# Patient Record
Sex: MALE | Race: WHITE | Employment: OTHER | URBAN - METROPOLITAN AREA
[De-identification: names, ages, dates, MRNs, and addresses within clinical notes are randomized per-mention and may not be internally consistent; named-entity substitution may affect disease eponyms.]

---

## 2021-07-21 ENCOUNTER — HOSPITAL ENCOUNTER (INPATIENT)
Age: 86
LOS: 5 days | Discharge: HOME | DRG: 415 | End: 2021-07-26
Attending: INTERNAL MEDICINE | Admitting: INTERNAL MEDICINE
Payer: MEDICARE

## 2021-07-21 DIAGNOSIS — K80.50 CHOLEDOCHOLITHIASIS: Primary | ICD-10-CM

## 2021-07-21 LAB
ALBUMIN SERPL-MCNC: 4.3 GM/DL (ref 3.4–5)
ALP BLD-CCNC: 280 IU/L (ref 40–129)
ALT SERPL-CCNC: 676 U/L (ref 10–40)
AMYLASE: 46 U/L (ref 25–115)
ANION GAP SERPL CALCULATED.3IONS-SCNC: 11 MMOL/L (ref 4–16)
APTT: 27 SECONDS (ref 25.1–37.1)
AST SERPL-CCNC: 443 IU/L (ref 15–37)
BASOPHILS ABSOLUTE: 0 K/CU MM
BASOPHILS RELATIVE PERCENT: 0.7 % (ref 0–1)
BILIRUB SERPL-MCNC: 2.8 MG/DL (ref 0–1)
BUN BLDV-MCNC: 15 MG/DL (ref 6–23)
CALCIUM SERPL-MCNC: 9 MG/DL (ref 8.3–10.6)
CHLORIDE BLD-SCNC: 98 MMOL/L (ref 99–110)
CO2: 30 MMOL/L (ref 21–32)
CREAT SERPL-MCNC: 1.2 MG/DL (ref 0.9–1.3)
DIFFERENTIAL TYPE: ABNORMAL
EOSINOPHILS ABSOLUTE: 0.1 K/CU MM
EOSINOPHILS RELATIVE PERCENT: 1.3 % (ref 0–3)
GFR AFRICAN AMERICAN: >60 ML/MIN/1.73M2
GFR NON-AFRICAN AMERICAN: 58 ML/MIN/1.73M2
GLUCOSE BLD-MCNC: 124 MG/DL (ref 70–99)
HCT VFR BLD CALC: 48 % (ref 42–52)
HEMOGLOBIN: 16.1 GM/DL (ref 13.5–18)
IMMATURE NEUTROPHIL %: 0.9 % (ref 0–0.43)
INR BLD: 0.94 INDEX
LIPASE: 35 IU/L (ref 13–60)
LYMPHOCYTES ABSOLUTE: 0.5 K/CU MM
LYMPHOCYTES RELATIVE PERCENT: 8.9 % (ref 24–44)
MCH RBC QN AUTO: 26.9 PG (ref 27–31)
MCHC RBC AUTO-ENTMCNC: 33.5 % (ref 32–36)
MCV RBC AUTO: 80.1 FL (ref 78–100)
MONOCYTES ABSOLUTE: 0.6 K/CU MM
MONOCYTES RELATIVE PERCENT: 10.4 % (ref 0–4)
NUCLEATED RBC %: 0 %
PDW BLD-RTO: 17.1 % (ref 11.7–14.9)
PLATELET # BLD: 206 K/CU MM (ref 140–440)
PMV BLD AUTO: 10.1 FL (ref 7.5–11.1)
POTASSIUM SERPL-SCNC: 4.1 MMOL/L (ref 3.5–5.1)
PROTHROMBIN TIME: 12.1 SECONDS (ref 11.7–14.5)
RBC # BLD: 5.99 M/CU MM (ref 4.6–6.2)
SEGMENTED NEUTROPHILS ABSOLUTE COUNT: 4.2 K/CU MM
SEGMENTED NEUTROPHILS RELATIVE PERCENT: 77.8 % (ref 36–66)
SODIUM BLD-SCNC: 139 MMOL/L (ref 135–145)
TOTAL IMMATURE NEUTOROPHIL: 0.05 K/CU MM
TOTAL NUCLEATED RBC: 0 K/CU MM
TOTAL PROTEIN: 5.9 GM/DL (ref 6.4–8.2)
WBC # BLD: 5.4 K/CU MM (ref 4–10.5)

## 2021-07-21 PROCEDURE — 82150 ASSAY OF AMYLASE: CPT

## 2021-07-21 PROCEDURE — 83690 ASSAY OF LIPASE: CPT

## 2021-07-21 PROCEDURE — 36415 COLL VENOUS BLD VENIPUNCTURE: CPT

## 2021-07-21 PROCEDURE — 6370000000 HC RX 637 (ALT 250 FOR IP): Performed by: INTERNAL MEDICINE

## 2021-07-21 PROCEDURE — 1200000000 HC SEMI PRIVATE

## 2021-07-21 PROCEDURE — 85730 THROMBOPLASTIN TIME PARTIAL: CPT

## 2021-07-21 PROCEDURE — 2580000003 HC RX 258: Performed by: INTERNAL MEDICINE

## 2021-07-21 PROCEDURE — 85025 COMPLETE CBC W/AUTO DIFF WBC: CPT

## 2021-07-21 PROCEDURE — 94761 N-INVAS EAR/PLS OXIMETRY MLT: CPT

## 2021-07-21 PROCEDURE — 80053 COMPREHEN METABOLIC PANEL: CPT

## 2021-07-21 PROCEDURE — 85610 PROTHROMBIN TIME: CPT

## 2021-07-21 RX ORDER — SODIUM CHLORIDE 9 MG/ML
INJECTION, SOLUTION INTRAVENOUS CONTINUOUS
Status: DISCONTINUED | OUTPATIENT
Start: 2021-07-21 | End: 2021-07-22

## 2021-07-21 RX ORDER — SODIUM CHLORIDE 0.9 % (FLUSH) 0.9 %
5-40 SYRINGE (ML) INJECTION EVERY 12 HOURS SCHEDULED
Status: DISCONTINUED | OUTPATIENT
Start: 2021-07-21 | End: 2021-07-26 | Stop reason: HOSPADM

## 2021-07-21 RX ORDER — TAMSULOSIN HYDROCHLORIDE 0.4 MG/1
0.4 CAPSULE ORAL DAILY
Status: DISCONTINUED | OUTPATIENT
Start: 2021-07-21 | End: 2021-07-26 | Stop reason: HOSPADM

## 2021-07-21 RX ORDER — ACETAMINOPHEN 650 MG/1
650 SUPPOSITORY RECTAL EVERY 6 HOURS PRN
Status: DISCONTINUED | OUTPATIENT
Start: 2021-07-21 | End: 2021-07-26 | Stop reason: HOSPADM

## 2021-07-21 RX ORDER — SODIUM CHLORIDE 9 MG/ML
25 INJECTION, SOLUTION INTRAVENOUS PRN
Status: DISCONTINUED | OUTPATIENT
Start: 2021-07-21 | End: 2021-07-26 | Stop reason: HOSPADM

## 2021-07-21 RX ORDER — AMLODIPINE BESYLATE 10 MG/1
10 TABLET ORAL DAILY
Status: DISCONTINUED | OUTPATIENT
Start: 2021-07-21 | End: 2021-07-26 | Stop reason: HOSPADM

## 2021-07-21 RX ORDER — POLYETHYLENE GLYCOL 3350 17 G/17G
17 POWDER, FOR SOLUTION ORAL DAILY PRN
Status: DISCONTINUED | OUTPATIENT
Start: 2021-07-21 | End: 2021-07-26 | Stop reason: HOSPADM

## 2021-07-21 RX ORDER — MORPHINE SULFATE 2 MG/ML
2 INJECTION, SOLUTION INTRAMUSCULAR; INTRAVENOUS EVERY 6 HOURS PRN
Status: DISCONTINUED | OUTPATIENT
Start: 2021-07-21 | End: 2021-07-23

## 2021-07-21 RX ORDER — ACETAMINOPHEN 325 MG/1
650 TABLET ORAL EVERY 6 HOURS PRN
Status: DISCONTINUED | OUTPATIENT
Start: 2021-07-21 | End: 2021-07-26 | Stop reason: HOSPADM

## 2021-07-21 RX ORDER — SODIUM CHLORIDE 0.9 % (FLUSH) 0.9 %
5-40 SYRINGE (ML) INJECTION PRN
Status: DISCONTINUED | OUTPATIENT
Start: 2021-07-21 | End: 2021-07-26 | Stop reason: HOSPADM

## 2021-07-21 RX ORDER — ONDANSETRON 4 MG/1
4 TABLET, ORALLY DISINTEGRATING ORAL EVERY 8 HOURS PRN
Status: DISCONTINUED | OUTPATIENT
Start: 2021-07-21 | End: 2021-07-26 | Stop reason: HOSPADM

## 2021-07-21 RX ORDER — ONDANSETRON 2 MG/ML
4 INJECTION INTRAMUSCULAR; INTRAVENOUS EVERY 6 HOURS PRN
Status: DISCONTINUED | OUTPATIENT
Start: 2021-07-21 | End: 2021-07-26 | Stop reason: HOSPADM

## 2021-07-21 RX ADMIN — AMLODIPINE BESYLATE 10 MG: 10 TABLET ORAL at 20:03

## 2021-07-21 RX ADMIN — SODIUM CHLORIDE, PRESERVATIVE FREE 10 ML: 5 INJECTION INTRAVENOUS at 20:03

## 2021-07-21 RX ADMIN — METOPROLOL TARTRATE 25 MG: 25 TABLET, FILM COATED ORAL at 21:25

## 2021-07-21 RX ADMIN — TAMSULOSIN HYDROCHLORIDE 0.4 MG: 0.4 CAPSULE ORAL at 20:01

## 2021-07-21 RX ADMIN — SODIUM CHLORIDE, PRESERVATIVE FREE 10 ML: 5 INJECTION INTRAVENOUS at 20:01

## 2021-07-21 RX ADMIN — SODIUM CHLORIDE: 9 INJECTION, SOLUTION INTRAVENOUS at 21:25

## 2021-07-21 ASSESSMENT — PAIN SCALES - GENERAL: PAINLEVEL_OUTOF10: 0

## 2021-07-21 NOTE — H&P
History and Physical      Name:  Najma Jimenez /Age/Sex: 1935  (82 y.o. male)   MRN & CSN:  [id number] & [id number] Admission Date/Time: 2021  5:22 PM   Location:  [facility] PCP: No primary care provider on file. Hospital Day: 1    Assessment and Plan:   Najma Jimenez is a 82 y.o.  male  who presents with abdominal pain    1) Acute Symptomatic Cholelithiasis and Choledocholithiasis   -Ultrasound negative for cholecystitis but showed cholelithiasis  -MRCP done at the referring hospital:mild intrahepatic biliary dilatation of the left lobe and multiple filling defects are seen filling the distal 2.5 cm of CBD  -AST/ALT/ALP elevated  -GI on board for ERCP  -Will need general surgery evaluation for cholecystectomy      2) Essential hypertension  -Uncontrolled  -Resume meds    Other chronic medical conditions, medication resumed unless contraindicated    BPH  GERD  Hyperlipidemia      Diet Diet NPO   DVT Prophylaxis [] Lovenox, []  Heparin, [] SCDs, [] Ambulation   GI Prophylaxis [] PPI,  [] H2 Blocker,  [] Carafate,  [] Diet/Tube Feeds   Code Status Full Code   Disposition Patient requires continued admission due to abdominal stone   MDM [] Low, [x] Moderate,[]  High  Patient's risk as above due to choledocholithiasis     History of Present Illness:     Chief Complaint: <principal problem not specified>  Najma Jimenez is a 82 y.o.  male  who presents with abdominal pain. Patient reported pain started about couple of days ago, described as sharp across the abdomen with intensity of 10 out of 10 and no apparent radiation to any other part of the body. Reported associated nausea and vomiting. Denied any fever, chills. No diarrhea, reports constipation. As a result of this, he was evaluated at [facility]. Blood work suggestive of elevated liver enzymes, abdominal ultrasound reviewed cholelithiasis with no evidence of cholecystitis.  MRCP was done which showed mild intrahepatic biliary dilatation of the left lobe and multiple filling defects are seen filling the distal 2.5 cm of CBD. Patient was transferred to our center for possible ERCP       Ten point ROS reviewed negative, unless as noted above    Objective:   No intake or output data in the 24 hours ending 07/21/21 1753   Vitals: There were no vitals filed for this visit. Physical Exam:   GEN Awake male, sitting upright in bed in no apparent distress. Appears given age. EYES Pupils are equally round. No scleral erythema, discharge, or conjunctivitis. HENT Mucous membranes are moist. Oral pharynx without exudates, no evidence of thrush. NECK Supple, no apparent thyromegaly or masses. RESP Clear to auscultation, no wheezes, rales or rhonchi. Symmetric chest movement while on room air. CARDIO/VASC S1/S2 auscultated. Regular rate without appreciable murmurs, rubs, or gallops. No JVD or carotid bruits. Peripheral pulses equal bilaterally and palpable. No peripheral edema. GI Abdomen is soft without significant tenderness, masses, or guarding. Bowel sounds are normoactive. Rectal exam deferred.  No costovertebral angle tenderness. Normal appearing external genitalia. Alberts catheter is not present. HEME/LYMPH No palpable cervical lymphadenopathy and no hepatosplenomegaly. No petechiae or ecchymoses. MSK No gross joint deformities. SKIN Normal coloration, warm, dry. NEURO Cranial nerves appear grossly intact, normal speech, no lateralizing weakness. PSYCH Awake, alert, oriented x 4. Affect appropriate. Past Medical History:    No past medical history on file. PSHX:  has no past surgical history on file.     Allergies: Not on File    FAM HX: Reviewed but noncontributory   Soc HX:   Social History     Socioeconomic History    Marital status: Not on file     Spouse name: Not on file    Number of children: Not on file    Years of education: Not on file    Highest education level: Not on file   Occupational History    Not on file   Tobacco Use    Smoking status: Not on file   Substance and Sexual Activity    Alcohol use: Not on file    Drug use: Not on file    Sexual activity: Not on file   Other Topics Concern    Not on file   Social History Narrative    Not on file     Social Determinants of Health     Financial Resource Strain:     Difficulty of Paying Living Expenses:    Food Insecurity:     Worried About Running Out of Food in the Last Year:     [address] in the Last Year:    Transportation Needs:     Lack of Transportation (Medical):      Lack of Transportation (Non-Medical):    Physical Activity:     Days of Exercise per Week:     Minutes of Exercise per Session:    Stress:     Feeling of Stress :    Social Connections:     Frequency of Communication with Friends and Family:     Frequency of Social Gatherings with Friends and Family:     Attends Alev[employer] Services:     Active Member of Clubs or Organizations:     Attends Club or Organization Meetings:     Marital Status:    Intimate Partner Violence:     Fear of Current or Ex-Partner:     Emotionally Abused:     Physically Abused:     Sexually Abused:        Medications:   Medications:    sodium chloride flush  5-40 mL Intravenous 2 times per day    [START ON 7/22/2021] enoxaparin  40 mg Subcutaneous Daily      Infusions:    sodium chloride      sodium chloride       PRN Meds: sodium chloride flush, 5-40 mL, PRN  sodium chloride, 25 mL, PRN  ondansetron, 4 mg, Q8H PRN   Or  ondansetron, 4 mg, Q6H PRN  polyethylene glycol, 17 g, Daily PRN  acetaminophen, 650 mg, Q6H PRN   Or  acetaminophen, 650 mg, Q6H PRN  morphine, 2 mg, Q6H PRN      Prior to Admission medications    Not on File         Electronically signed by Taylor Paula MD on 7/21/2021 at 5:53 PM

## 2021-07-21 NOTE — PROGRESS NOTES
Patient arrived to room 4128. No skin issues noted. Registration called, working on getting patient admitted into [facility]. Will notify hospitalist once patient is in [facility] [address] system. Patient denies any needs at this time.

## 2021-07-22 ENCOUNTER — ANESTHESIA (OUTPATIENT)
Dept: ENDOSCOPY | Age: 86
DRG: 415 | End: 2021-07-22
Payer: MEDICARE

## 2021-07-22 ENCOUNTER — ANESTHESIA EVENT (OUTPATIENT)
Dept: OPERATING ROOM | Age: 86
DRG: 415 | End: 2021-07-22
Payer: MEDICARE

## 2021-07-22 ENCOUNTER — ANESTHESIA EVENT (OUTPATIENT)
Dept: ENDOSCOPY | Age: 86
DRG: 415 | End: 2021-07-22
Payer: MEDICARE

## 2021-07-22 ENCOUNTER — APPOINTMENT (OUTPATIENT)
Dept: GENERAL RADIOLOGY | Age: 86
DRG: 415 | End: 2021-07-22
Attending: INTERNAL MEDICINE
Payer: MEDICARE

## 2021-07-22 VITALS
SYSTOLIC BLOOD PRESSURE: 134 MMHG | DIASTOLIC BLOOD PRESSURE: 88 MMHG | TEMPERATURE: 97.1 F | RESPIRATION RATE: 25 BRPM | OXYGEN SATURATION: 100 %

## 2021-07-22 LAB
ALBUMIN SERPL-MCNC: 3.6 GM/DL (ref 3.4–5)
ALP BLD-CCNC: 230 IU/L (ref 40–128)
ALT SERPL-CCNC: 635 U/L (ref 10–40)
AMYLASE: 38 U/L (ref 25–115)
ANION GAP SERPL CALCULATED.3IONS-SCNC: 11 MMOL/L (ref 4–16)
AST SERPL-CCNC: 392 IU/L (ref 15–37)
BASOPHILS ABSOLUTE: 0 K/CU MM
BASOPHILS RELATIVE PERCENT: 0.8 % (ref 0–1)
BILIRUB SERPL-MCNC: 2.4 MG/DL (ref 0–1)
BUN BLDV-MCNC: 14 MG/DL (ref 6–23)
CALCIUM SERPL-MCNC: 8.9 MG/DL (ref 8.3–10.6)
CHLORIDE BLD-SCNC: 101 MMOL/L (ref 99–110)
CO2: 30 MMOL/L (ref 21–32)
CREAT SERPL-MCNC: 1.2 MG/DL (ref 0.9–1.3)
DIFFERENTIAL TYPE: ABNORMAL
EOSINOPHILS ABSOLUTE: 0.1 K/CU MM
EOSINOPHILS RELATIVE PERCENT: 1.6 % (ref 0–3)
GFR AFRICAN AMERICAN: >60 ML/MIN/1.73M2
GFR NON-AFRICAN AMERICAN: 58 ML/MIN/1.73M2
GLUCOSE BLD-MCNC: 106 MG/DL (ref 70–99)
HCT VFR BLD CALC: 44.8 % (ref 42–52)
HEMOGLOBIN: 14.6 GM/DL (ref 13.5–18)
IMMATURE NEUTROPHIL %: 1.6 % (ref 0–0.43)
LIPASE: 31 IU/L (ref 13–60)
LYMPHOCYTES ABSOLUTE: 0.6 K/CU MM
LYMPHOCYTES RELATIVE PERCENT: 12 % (ref 24–44)
MCH RBC QN AUTO: 26.1 PG (ref 27–31)
MCHC RBC AUTO-ENTMCNC: 32.6 % (ref 32–36)
MCV RBC AUTO: 80.1 FL (ref 78–100)
MONOCYTES ABSOLUTE: 0.7 K/CU MM
MONOCYTES RELATIVE PERCENT: 13.2 % (ref 0–4)
NUCLEATED RBC %: 0 %
PDW BLD-RTO: 16 % (ref 11.7–14.9)
PLATELET # BLD: 176 K/CU MM (ref 140–440)
PMV BLD AUTO: 10.1 FL (ref 7.5–11.1)
POTASSIUM SERPL-SCNC: 3.8 MMOL/L (ref 3.5–5.1)
RBC # BLD: 5.59 M/CU MM (ref 4.6–6.2)
SARS-COV-2, NAAT: NOT DETECTED
SEGMENTED NEUTROPHILS ABSOLUTE COUNT: 3.7 K/CU MM
SEGMENTED NEUTROPHILS RELATIVE PERCENT: 70.8 % (ref 36–66)
SODIUM BLD-SCNC: 142 MMOL/L (ref 135–145)
SOURCE: NORMAL
TOTAL IMMATURE NEUTOROPHIL: 0.08 K/CU MM
TOTAL NUCLEATED RBC: 0 K/CU MM
TOTAL PROTEIN: 5.6 GM/DL (ref 6.4–8.2)
WBC # BLD: 5.2 K/CU MM (ref 4–10.5)

## 2021-07-22 PROCEDURE — 2580000003 HC RX 258: Performed by: SPECIALIST

## 2021-07-22 PROCEDURE — 2580000003 HC RX 258: Performed by: INTERNAL MEDICINE

## 2021-07-22 PROCEDURE — 3700000001 HC ADD 15 MINUTES (ANESTHESIA): Performed by: SPECIALIST

## 2021-07-22 PROCEDURE — 94761 N-INVAS EAR/PLS OXIMETRY MLT: CPT

## 2021-07-22 PROCEDURE — 2500000003 HC RX 250 WO HCPCS

## 2021-07-22 PROCEDURE — 36415 COLL VENOUS BLD VENIPUNCTURE: CPT

## 2021-07-22 PROCEDURE — 2580000003 HC RX 258: Performed by: ANESTHESIOLOGY

## 2021-07-22 PROCEDURE — 2709999900 HC NON-CHARGEABLE SUPPLY: Performed by: SPECIALIST

## 2021-07-22 PROCEDURE — 7100000000 HC PACU RECOVERY - FIRST 15 MIN: Performed by: SPECIALIST

## 2021-07-22 PROCEDURE — 80053 COMPREHEN METABOLIC PANEL: CPT

## 2021-07-22 PROCEDURE — 6360000002 HC RX W HCPCS: Performed by: SPECIALIST

## 2021-07-22 PROCEDURE — 6360000002 HC RX W HCPCS: Performed by: INTERNAL MEDICINE

## 2021-07-22 PROCEDURE — C1726 CATH, BAL DIL, NON-VASCULAR: HCPCS | Performed by: SPECIALIST

## 2021-07-22 PROCEDURE — 1200000000 HC SEMI PRIVATE

## 2021-07-22 PROCEDURE — 6370000000 HC RX 637 (ALT 250 FOR IP): Performed by: INTERNAL MEDICINE

## 2021-07-22 PROCEDURE — 99221 1ST HOSP IP/OBS SF/LOW 40: CPT | Performed by: SURGERY

## 2021-07-22 PROCEDURE — C9113 INJ PANTOPRAZOLE SODIUM, VIA: HCPCS | Performed by: SPECIALIST

## 2021-07-22 PROCEDURE — 0D778ZZ DILATION OF STOMACH, PYLORUS, VIA NATURAL OR ARTIFICIAL OPENING ENDOSCOPIC: ICD-10-PCS | Performed by: SPECIALIST

## 2021-07-22 PROCEDURE — 85025 COMPLETE CBC W/AUTO DIFF WBC: CPT

## 2021-07-22 PROCEDURE — 3700000000 HC ANESTHESIA ATTENDED CARE: Performed by: SPECIALIST

## 2021-07-22 PROCEDURE — 83690 ASSAY OF LIPASE: CPT

## 2021-07-22 PROCEDURE — 7100000001 HC PACU RECOVERY - ADDTL 15 MIN: Performed by: SPECIALIST

## 2021-07-22 PROCEDURE — 87635 SARS-COV-2 COVID-19 AMP PRB: CPT

## 2021-07-22 PROCEDURE — C1769 GUIDE WIRE: HCPCS | Performed by: SPECIALIST

## 2021-07-22 PROCEDURE — 82150 ASSAY OF AMYLASE: CPT

## 2021-07-22 PROCEDURE — 6360000002 HC RX W HCPCS

## 2021-07-22 PROCEDURE — 3609017700 HC EGD DILATION GASTRIC/DUODENAL STRICTURE: Performed by: SPECIALIST

## 2021-07-22 RX ORDER — GLYCOPYRROLATE 0.2 MG/ML
INJECTION INTRAMUSCULAR; INTRAVENOUS PRN
Status: DISCONTINUED | OUTPATIENT
Start: 2021-07-22 | End: 2021-07-22 | Stop reason: SDUPTHER

## 2021-07-22 RX ORDER — PANTOPRAZOLE SODIUM 40 MG/10ML
40 INJECTION, POWDER, LYOPHILIZED, FOR SOLUTION INTRAVENOUS DAILY
Status: DISCONTINUED | OUTPATIENT
Start: 2021-07-22 | End: 2021-07-26 | Stop reason: HOSPADM

## 2021-07-22 RX ORDER — PROPOFOL 10 MG/ML
INJECTION, EMULSION INTRAVENOUS PRN
Status: DISCONTINUED | OUTPATIENT
Start: 2021-07-22 | End: 2021-07-22 | Stop reason: SDUPTHER

## 2021-07-22 RX ORDER — EPHEDRINE SULFATE 50 MG/ML
INJECTION INTRAVENOUS PRN
Status: DISCONTINUED | OUTPATIENT
Start: 2021-07-22 | End: 2021-07-22 | Stop reason: SDUPTHER

## 2021-07-22 RX ORDER — LIDOCAINE HYDROCHLORIDE 20 MG/ML
INJECTION, SOLUTION EPIDURAL; INFILTRATION; INTRACAUDAL; PERINEURAL PRN
Status: DISCONTINUED | OUTPATIENT
Start: 2021-07-22 | End: 2021-07-22 | Stop reason: SDUPTHER

## 2021-07-22 RX ORDER — SODIUM CHLORIDE, SODIUM LACTATE, POTASSIUM CHLORIDE, CALCIUM CHLORIDE 600; 310; 30; 20 MG/100ML; MG/100ML; MG/100ML; MG/100ML
INJECTION, SOLUTION INTRAVENOUS CONTINUOUS
Status: DISCONTINUED | OUTPATIENT
Start: 2021-07-22 | End: 2021-07-23

## 2021-07-22 RX ORDER — SUCCINYLCHOLINE CHLORIDE 20 MG/ML
INJECTION INTRAMUSCULAR; INTRAVENOUS PRN
Status: DISCONTINUED | OUTPATIENT
Start: 2021-07-22 | End: 2021-07-22 | Stop reason: SDUPTHER

## 2021-07-22 RX ADMIN — EPHEDRINE SULFATE 10 MG: 50 INJECTION INTRAVENOUS at 14:49

## 2021-07-22 RX ADMIN — PANTOPRAZOLE SODIUM 40 MG: 40 INJECTION, POWDER, FOR SOLUTION INTRAVENOUS at 18:35

## 2021-07-22 RX ADMIN — PROPOFOL 200 MG: 10 INJECTION, EMULSION INTRAVENOUS at 14:12

## 2021-07-22 RX ADMIN — PHENYLEPHRINE HYDROCHLORIDE 100 MCG: 10 INJECTION INTRAVENOUS at 15:11

## 2021-07-22 RX ADMIN — GLUCAGON HYDROCHLORIDE 1 MG: KIT at 14:28

## 2021-07-22 RX ADMIN — SODIUM CHLORIDE, PRESERVATIVE FREE 10 ML: 5 INJECTION INTRAVENOUS at 20:01

## 2021-07-22 RX ADMIN — SODIUM CHLORIDE, PRESERVATIVE FREE 5 ML: 5 INJECTION INTRAVENOUS at 10:10

## 2021-07-22 RX ADMIN — SODIUM CHLORIDE, POTASSIUM CHLORIDE, SODIUM LACTATE AND CALCIUM CHLORIDE: 600; 310; 30; 20 INJECTION, SOLUTION INTRAVENOUS at 13:27

## 2021-07-22 RX ADMIN — MORPHINE SULFATE 2 MG: 2 INJECTION, SOLUTION INTRAMUSCULAR; INTRAVENOUS at 03:21

## 2021-07-22 RX ADMIN — SUCCINYLCHOLINE CHLORIDE 100 MG: 20 INJECTION, SOLUTION INTRAMUSCULAR; INTRAVENOUS at 14:12

## 2021-07-22 RX ADMIN — ONDANSETRON 4 MG: 2 INJECTION INTRAMUSCULAR; INTRAVENOUS at 09:55

## 2021-07-22 RX ADMIN — SODIUM CHLORIDE 1500 MG: 900 INJECTION INTRAVENOUS at 12:14

## 2021-07-22 RX ADMIN — METOPROLOL TARTRATE 25 MG: 25 TABLET, FILM COATED ORAL at 20:01

## 2021-07-22 RX ADMIN — GLYCOPYRROLATE 0.3 MG: 0.2 INJECTION, SOLUTION INTRAMUSCULAR; INTRAVENOUS at 14:42

## 2021-07-22 RX ADMIN — SODIUM CHLORIDE 1500 MG: 900 INJECTION INTRAVENOUS at 17:23

## 2021-07-22 RX ADMIN — SODIUM CHLORIDE, POTASSIUM CHLORIDE, SODIUM LACTATE AND CALCIUM CHLORIDE: 600; 310; 30; 20 INJECTION, SOLUTION INTRAVENOUS at 16:11

## 2021-07-22 RX ADMIN — MORPHINE SULFATE 2 MG: 2 INJECTION, SOLUTION INTRAMUSCULAR; INTRAVENOUS at 09:55

## 2021-07-22 RX ADMIN — EPHEDRINE SULFATE 10 MG: 50 INJECTION INTRAVENOUS at 14:58

## 2021-07-22 RX ADMIN — SODIUM CHLORIDE, PRESERVATIVE FREE 10 ML: 5 INJECTION INTRAVENOUS at 18:35

## 2021-07-22 RX ADMIN — PHENYLEPHRINE HYDROCHLORIDE 100 MCG: 10 INJECTION INTRAVENOUS at 14:36

## 2021-07-22 RX ADMIN — ONDANSETRON 4 MG: 2 INJECTION INTRAMUSCULAR; INTRAVENOUS at 03:20

## 2021-07-22 RX ADMIN — LIDOCAINE HYDROCHLORIDE 100 MG: 20 INJECTION, SOLUTION EPIDURAL; INFILTRATION; INTRACAUDAL; PERINEURAL at 14:12

## 2021-07-22 ASSESSMENT — PULMONARY FUNCTION TESTS
PIF_VALUE: 5
PIF_VALUE: 19
PIF_VALUE: 18
PIF_VALUE: 18
PIF_VALUE: 34
PIF_VALUE: 1
PIF_VALUE: 1
PIF_VALUE: 21
PIF_VALUE: 22
PIF_VALUE: 20
PIF_VALUE: 41
PIF_VALUE: 23
PIF_VALUE: 19
PIF_VALUE: 0
PIF_VALUE: 21
PIF_VALUE: 15
PIF_VALUE: 20
PIF_VALUE: 21
PIF_VALUE: 21
PIF_VALUE: 1
PIF_VALUE: 2
PIF_VALUE: 21
PIF_VALUE: 19
PIF_VALUE: 20
PIF_VALUE: 21
PIF_VALUE: 21
PIF_VALUE: 0
PIF_VALUE: 3
PIF_VALUE: 22
PIF_VALUE: 21
PIF_VALUE: 21
PIF_VALUE: 18
PIF_VALUE: 26
PIF_VALUE: 20
PIF_VALUE: 20
PIF_VALUE: 19
PIF_VALUE: 20
PIF_VALUE: 21
PIF_VALUE: 0
PIF_VALUE: 20
PIF_VALUE: 0
PIF_VALUE: 0
PIF_VALUE: 20
PIF_VALUE: 20
PIF_VALUE: 19
PIF_VALUE: 20
PIF_VALUE: 7
PIF_VALUE: 6
PIF_VALUE: 20
PIF_VALUE: 21
PIF_VALUE: 21
PIF_VALUE: 18
PIF_VALUE: 9
PIF_VALUE: 20
PIF_VALUE: 20
PIF_VALUE: 4
PIF_VALUE: 3
PIF_VALUE: 26
PIF_VALUE: 22
PIF_VALUE: 20
PIF_VALUE: 23
PIF_VALUE: 19
PIF_VALUE: 1
PIF_VALUE: 17
PIF_VALUE: 21
PIF_VALUE: 6
PIF_VALUE: 21
PIF_VALUE: 11
PIF_VALUE: 20
PIF_VALUE: 20
PIF_VALUE: 18
PIF_VALUE: 4
PIF_VALUE: 21
PIF_VALUE: 21
PIF_VALUE: 20
PIF_VALUE: 19
PIF_VALUE: 3
PIF_VALUE: 19
PIF_VALUE: 23
PIF_VALUE: 20
PIF_VALUE: 34
PIF_VALUE: 22
PIF_VALUE: 21
PIF_VALUE: 25
PIF_VALUE: 21
PIF_VALUE: 18
PIF_VALUE: 21
PIF_VALUE: 25
PIF_VALUE: 1
PIF_VALUE: 20
PIF_VALUE: 3
PIF_VALUE: 20
PIF_VALUE: 22
PIF_VALUE: 20
PIF_VALUE: 21
PIF_VALUE: 20
PIF_VALUE: 21
PIF_VALUE: 20
PIF_VALUE: 29
PIF_VALUE: 20
PIF_VALUE: 19

## 2021-07-22 ASSESSMENT — PAIN DESCRIPTION - PAIN TYPE: TYPE: ACUTE PAIN

## 2021-07-22 ASSESSMENT — PAIN SCALES - GENERAL
PAINLEVEL_OUTOF10: 9
PAINLEVEL_OUTOF10: 7

## 2021-07-22 ASSESSMENT — PAIN DESCRIPTION - LOCATION: LOCATION: HEAD

## 2021-07-22 NOTE — PROGRESS NOTES
Received report from Dayana Covington prior to transfer to endo unit. Pt is alert and oriented, npo since midnight.  He has signed consent

## 2021-07-22 NOTE — PROGRESS NOTES
Patient tolerated procedure well. Patient very drowsy but arousable, no pain or needs voiced. Patient taken to PACU via bed. Report given to PACU RN at bedside.

## 2021-07-22 NOTE — ANESTHESIA PRE PROCEDURE
Department of Anesthesiology  Preprocedure Note       Name:  Ruddy Casey   Age:  82 y.o.  :  1935                                          MRN:  [id number]         Date:  2021      Surgeon: Gilbert Hoyos):  Jeanie Smalls MD    Procedure: Procedure(s):  ERCP DIAGNOSTIC    Medications prior to admission:   Prior to Admission medications    Not on File       Current medications:    Current Facility-Administered Medications   Medication Dose Route Frequency Provider Last Rate Last Admin    sodium chloride flush 0.9 % injection 5-40 mL  5-40 mL Intravenous 2 times per day Sirisha Cuellar MD   10 mL at 21    sodium chloride flush 0.9 % injection 5-40 mL  5-40 mL Intravenous PRN Sirisha Cuellar MD        0.9 % sodium chloride infusion  25 mL Intravenous PRN Sirisha Cuellar MD        ondansetron (ZOFRAN-ODT) disintegrating tablet 4 mg  4 mg Oral Q8H PRN Sirisha Cuellar MD        Or    ondansetron [facility]) injection 4 mg  4 mg Intravenous Q6H PRN Sirisha Cuellar MD   4 mg at 21 032    polyethylene glycol (GLYCOLAX) packet 17 g  17 g Oral Daily PRN Sirisha Cuellar MD        acetaminophen (TYLENOL) tablet 650 mg  650 mg Oral Q6H PRN Sirisha Cuellar MD        Or    acetaminophen (TYLENOL) suppository 650 mg  650 mg Rectal Q6H PRN Sirisha Cuellar MD        morphine (PF) injection 2 mg  2 mg Intravenous Q6H PRN Sirisha Cuellar MD   2 mg at 21 032    0.9 % sodium chloride infusion   Intravenous Continuous Sirisha Cuellar MD 75 mL/hr at 21 New Bag at 21    hydrALAZINE (APRESOLINE) 10 mg in sodium chloride 0.9 % 50 mL ivpb  10 mg Intravenous Q6H PRN Sirisha Cuellar MD        [START ON 2021] enoxaparin (LOVENOX) injection 40 mg  40 mg Subcutaneous Daily Jeanie Smalls MD        metoprolol tartrate (LOPRESSOR) tablet 25 mg  25 mg Oral BID Sirisha Cuellar MD   25 mg at 21    amLODIPine (NORVASC) tablet 10 mg  10 mg Oral Daily Xavi Borrego MD   10 mg at 07/21/21 2003    tamsulosin [facility]) capsule 0.4 mg  0.4 mg Oral Daily Xavi Borrego MD   0.4 mg at 07/21/21 2001       Allergies:  No Known Allergies    Problem List:    Patient Active Problem List   Diagnosis Code    Choledocholithiasis K80.50       Past Medical History:  History reviewed. No pertinent past medical history. Past Surgical History:  History reviewed. No pertinent surgical history. Social History:    Social History     Tobacco Use    Smoking status: Not on file   Substance Use Topics    Alcohol use: Not on file                                Counseling given: Not Answered      Vital Signs (Current):   Vitals:    07/21/21 1814 07/21/21 1944 07/21/21 2053 07/22/21 0217   BP: (!) 188/88 (!) 171/74 (!) 161/87 129/84   Pulse: 64 58 66 61   Resp: 18 16 18 16   Temp: 36.8 °C (98.3 °F) 36.7 °C (98.1 °F)  36.9 °C (98.4 °F)   TempSrc: Oral Oral  Oral   SpO2: 97% 97% 97% 97%   Weight:    176 lb 4.8 oz (80 kg)   Height:    5' 8\" (1.727 m)                                              BP Readings from Last 3 Encounters:   07/22/21 129/84       NPO Status:                                                                                 BMI:   Wt Readings from Last 3 Encounters:   07/22/21 176 lb 4.8 oz (80 kg)     Body mass index is 26.81 kg/m².     CBC:   Lab Results   Component Value Date    WBC 5.2 07/22/2021    RBC 5.59 07/22/2021    HGB 14.6 07/22/2021    HCT 44.8 07/22/2021    MCV 80.1 07/22/2021    RDW 16.0 07/22/2021     07/22/2021       CMP:   Lab Results   Component Value Date     07/22/2021    K 3.8 07/22/2021     07/22/2021    CO2 30 07/22/2021    BUN 14 07/22/2021    CREATININE 1.2 07/22/2021    GFRAA >60 07/22/2021    LABGLOM 58 07/22/2021    GLUCOSE 106 07/22/2021    PROT 5.6 07/22/2021    CALCIUM 8.9 07/22/2021    BILITOT 2.4 07/22/2021    ALKPHOS 230 07/22/2021     07/22/2021     07/22/2021

## 2021-07-22 NOTE — PROGRESS NOTES
Preprocedure questions addressed with patient including NPO status, medications, and allergies. Patient taken to endoscopy procedure room via bed.

## 2021-07-22 NOTE — OP NOTE
the pyloric regions of the stomach were examined. Mucosa  of the stomach was slightly hyperemic, but no erosion or ulcers were  seen. Gastroscope was retroflexed and the fundus and cardia was  carefully examined and no mass lesions were seen. The previously  visualized stricture was again noted at the pyloric opening, but no  difficulty was encountered in advancing the gastroscope through the  stricture distally into the second portion of the duodenum. Duodenum:  The first and second portion of the duodenum were examined  and the mucosa was unremarkable. After examining the duodenum with the gastroscope, the gastroscope was  withdrawn into the stomach and dilatation of the stricture was carried  out with the CRE balloon size 8 to 15 mm respectively, and there was no  evidence of bleeding after the dilatation. After the pyloric opening  was dilated with a balloon, the gastroscope was removed and was again  replaced with a video Olympus duodenoscope, which was introduced into  back of throat and advanced into the stomach. Despite repeated attempts  still the duodenoscope could not be advanced through the pyloric opening  distally into the second portion of the duodenum. At this point, the  procedure was terminated. POSTOPERATIVE DIAGNOSES:  1. 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 2.  Status post dilatation of the pyloric channel with CRE balloon size  8 to 15 mm respectively. 3.  Unable to advance the duodenoscope through the pyloric opening into  the second portion of the duodenum. RECOMMENDATIONS:  1. We will continue present management. 2.  Protonix 40 mg q.a.m.  3.  We will monitor the patient's CBC and Chem profile. 4.  We will discuss the case with the surgical consultant regarding  further management. The patient tolerated the procedure well. There were no postprocedure  complications.   Blood loss during the procedure was Dale Johnston MD    D: 07/22/2021 15:49:12       T: 07/22/2021 15:58:37     [email or web address]  Job#: [id number]     Doc#: [id number]    CC:

## 2021-07-22 NOTE — PROGRESS NOTES
Patient requesting to speak to Dr. Livia Alvarez about possible surgery. Perfect serve sent to Dr. Livia Alvarez.

## 2021-07-22 NOTE — PROGRESS NOTES
1553: Patient arrived to PACU from Endo. Monitors applied, alarms on. Report obtained from [facility] and [employer]. 1615: Patient turned and repositioned in bed. Tolerated well. 1638: Report called to [id number] [facility] for room 5269.   [phone]: Patient transferred to room [phone] by transport staff.

## 2021-07-22 NOTE — CARE COORDINATION
Met c pt to initiate discharge planning. Pt lives at home with spouse, he remains fully ind with ADLs. Pt has pcp/ active insurance and can afford meds. Pt denied needs except he may need assistance with transport home.

## 2021-07-22 NOTE — CONSULTS
[facility]               [address], [address]                                  CONSULTATION    PATIENT NAME: Amber Bello                    :        1935  MED REC NO:   [id number]                          ROOM:       3235  ACCOUNT NO:   [de-identified]                           ADMIT DATE: 2021  PROVIDER:     Hanna Perez MD    CONSULT DATE:  2021    PRIMARY CARE PHYSICIAN:  Dorie Oro DO    ROOM:  9498. CHIEF COMPLAINT:  Upper abdominal pain with abnormal LFTs and imaging,  rule out symptomatic gallstones with choledocholithiasis. HISTORY OF PRESENT ILLNESS:  The patient is an 56-year-old white  gentleman who is being followed up by Dr. Dorie Oro in [place]  with past medical history significant for hypertension, otherwise in  apparently good health and presented to the emergency room at [facility] in [place] on 2021 with severe upper abdominal pain  radiating to the sides along with nauseated feeling and according to the  patient, the pain was 10/10. He was evaluated in the emergency room and  then admitted at the hospital.  The patient had an ultrasound, a CT scan  and MRCP done, which showed multiple gallstones and also there was  slight dilatation of the biliary tree with multiple filling defects  noted in the distal common bile duct suggestive of common bile duct  stones. The patient was transferred to [facility] yesterday for a preop ERCP followed by lap cholecystectomy. The patient denies fever, chills, hematemesis, melena or hematochezia. The patient has never had an EGD done, but did have a couple of  colonoscopies done. The last colonoscopy was approximately 4 years ago  in [place] and according to the patient, exam was unremarkable. The  patient is hemodynamically stable.     REVIEW OF SYSTEMS:  CENTRAL NERVOUS SYSTEM:  The patient denies headache or focal  sensorimotor symptoms. CARDIOVASCULAR SYSTEM:  No history of chest pain, shortness of breath,  or leg swelling. GENITOURINARY SYSTEM:  No history of dysuria, pyuria, or hematuria. MUSCULOSKELETAL SYSTEM:  No history of aches and pains in muscles and  joints. RESPIRATORY SYSTEM:  No history of cough, hemoptysis, fever or chills. PAST MEDICAL HISTORY:  Significant for history of hypertension. FAMILY HISTORY:  The patient's mother was diagnosed with metastatic  carcinoma of unknown primary. MEDICATIONS:  Please refer to chart. SOCIOECONOMIC HISTORY:  No history of EtOH abuse. The patient does not  smoke cigarettes. SURGERIES:  The patient has had bilateral shoulder surgery done,  bilateral carpal tunnel release, and bilateral inguinal herniorrhaphy. ALLERGIES:  No known drug allergies. PHYSICAL EXAMINATION:  GENERAL:  Shows an 74-year-old white gentleman of thin build and average  nutritional status for his age, who is lying comfortably flat in bed, in  no acute distress. He is awake, alert and oriented and pleasant to talk  with. VITAL SIGNS:  Stable. HEENT:  Examination shows skull to be atraumatic. NECK:  Supple. CHEST:  Clear. HEART:  S1 and S2 is normal.  ABDOMEN:  Soft and nondistended with mild tenderness in the upper  abdomen. No guarding or rigidity. Liver and spleen are not palpable. Bowel sounds are present. RECTAL EXAM:  Deferred. CNS EXAM:  Shows the patient to be awake, alert and oriented. There are  no focal sensorimotor sign. MUSCULOSKELETAL SYSTEM:  Exam shows evidence of degenerative joint  disease changes. LABORATORY DATA:  The labs drawn today comprised of Chem profile, which  is unremarkable. LFTs are abnormal with a total bilirubin of 2.4 and  AST is 382, ALT is 635 and serum lipase 31. Amylase is 38. CBC shows a  WBC count of 5.2, hemoglobin 14.6, platelet count of 699,132 and the  patient's INR is 0.94.     IMPRESSION:  An 42-year-old white gentleman presents with severe upper  abdominal pain along with nausea and has abnormal LFTs and imaging, rule  out symptomatic gallstones with common bile duct stones. RECOMMENDATIONS:  1. Agree with present management with IV fluids along with parenteral  analgesics and antiemetics. 2.  We will monitor the patient's CBC, Chem profile. 3.  We will proceed with ERCP with endoscopic sphincterotomy to clear  the common bile duct of the stone followed by lap cholecystectomy per  surgical consultant, Dr. Titi Briggs. 4.  The indications, alternatives and complications of the procedure  have been discussed with the patient in detail and he signed an informed  consent. 5.  We will also give the patient a dose of Unasyn 1.5 gm IV piggyback  prior to the procedure. Lesley Kidd MD    D: 07/22/2021 10:42:32       T: 07/22/2021 10:50:43     [email or web address]  Job#: [id number]     Doc#: [id number]    CC:   Suyapa Guevara DO

## 2021-07-22 NOTE — PROGRESS NOTES
Progress Note      Subjective:   Chief complaint: Abdominal pain    Interval History:   No issues overnight    Review of systems:           Medications:   Scheduled Meds:   ampicillin-sulbactam  1,500 mg Intravenous Q6H    indomethacin  100 mg Rectal Once    sodium chloride flush  5-40 mL Intravenous 2 times per day    [START ON 7/23/2021] enoxaparin  40 mg Subcutaneous Daily    metoprolol tartrate  25 mg Oral BID    amLODIPine  10 mg Oral Daily    tamsulosin  0.4 mg Oral Daily     Continuous Infusions:   lactated ringers 100 mL/hr at 07/22/21 1327    sodium chloride      sodium chloride 75 mL/hr at 07/21/21 2125       Objective:     Vital Signs  Temp: 98.3 °F (36.8 °C)  Pulse: 69  Resp: 16  BP: (!) 158/93  SpO2: 99 %  O2 Device: None (Room air)       Vital signs reviewed in electronic charts. Physical exam     Constitutional:  Well developed, well nourished, no acute distress. Eyes:  PERRL, conjunctiva normal, EOMI. HENT:  Atraumatic, external ears normal, external nose/nares normal, oropharynx moist, no pharyngeal exudates. Neck:  Supple. No JVD or thyromegaly. Respiratory:  No respiratory distress, normal breath sounds, no rales, no wheezing. Cardiovascular:  Normal rate, normal rhythm, no murmurs, no gallops, no rubs. GI:  Soft, nondistended, normal bowel sounds, nontender, no organomegaly, no mass. :  No costovertebral angle tenderness. Musculoskeletal:  No edema, no tenderness, no obvious deformities. Patient is moving all extremities. Integument:  Well hydrated, no rash. Lymphatic:  No cervical or axillary lymphadenopathy noted. Neurologic:  Alert & oriented x 3,  no focal deficits noted. Strength is equal throughout. Psychiatric:  Speech and behavior appropriate.     Results:     Lab Results   Component Value Date    WBC 5.2 07/22/2021    HGB 14.6 07/22/2021    HCT 44.8 07/22/2021    MCV 80.1 07/22/2021     07/22/2021       Lab Results   Component Value Date     07/22/2021    K 3.8 07/22/2021     07/22/2021    CO2 30 07/22/2021    BUN 14 07/22/2021    CREATININE 1.2 07/22/2021    GLUCOSE 106 07/22/2021    CALCIUM 8.9 07/22/2021        Assessment and Plan:      Active Hospital Problems    Diagnosis Date Noted    Choledocholithiasis [K80.50] 07/21/2021     Jana Dyer is a 82 y.o.  male  who presents with abdominal pain     1) Acute Symptomatic Cholelithiasis and Choledocholithiasis   -Ultrasound negative for cholecystitis but showed cholelithiasis  -MRCP done at the referring hospital:mild intrahepatic biliary dilatation of the left lobe and multiple filling defects are seen filling the distal 2.5 cm of CBD  -AST/ALT/ALP elevated  -GI on board for ERCP  Appreciate GI and surgery recommendations        2) Essential hypertension  -Uncontrolled  -Resume meds     Other chronic medical conditions, medication resumed unless contraindicated     BPH  GERD  Hyperlipidemia    Electronically signed by Jazmin Irvin DO on 7/22/2021 at 3:35 PM

## 2021-07-22 NOTE — CONSULTS
Department of General Surgery   Surgical Service Dr. Saurabh Palmer   Consult Note    Date of Consult: 7/22/21    Reason for Consult:  choledocholithiasis  Requesting Physician:  Dr. Kian Hampton:  Abdominal pain    History Obtained From:  patient, electronic medical record    HISTORY OF PRESENT ILLNESS:    The patient is a 82 y.o. male who presented to the hospital in [place] with epigastric and RUQ pain. He had associated nausea. His symptoms have improved since that time. Currently he denies abdominal pain or nausea. Workup in [place] was concerning for cholelithiasis and choledocholithiasis. On lab work here Britta Dubois has elevated bilirubin at 2.4 other LFTs are also elevated.      Past Medical History:    Past Medical History:   Diagnosis Date    Essential hypertension        Past Surgical History:    Past Surgical History:   Procedure Laterality Date    CARPAL TUNNEL RELEASE      INGUINAL HERNIA REPAIR      ROTATOR CUFF REPAIR Right     SHOULDER ARTHROPLASTY Left        Current Medications:   Current Facility-Administered Medications   Medication Dose Route Frequency Provider Last Rate Last Admin    sodium chloride flush 0.9 % injection 5-40 mL  5-40 mL Intravenous 2 times per day Bayron Owens MD   10 mL at 07/21/21 2003    sodium chloride flush 0.9 % injection 5-40 mL  5-40 mL Intravenous PRN Bayron Owens MD        0.9 % sodium chloride infusion  25 mL Intravenous PRN Bayron Owens MD        ondansetron (ZOFRAN-ODT) disintegrating tablet 4 mg  4 mg Oral Q8H PRN Bayron Owens MD        Or    ondansetron [facility]) injection 4 mg  4 mg Intravenous Q6H PRN Bayron Owens MD   4 mg at 07/22/21 0955    polyethylene glycol (GLYCOLAX) packet 17 g  17 g Oral Daily PRN Bayron Owens MD        acetaminophen (TYLENOL) tablet 650 mg  650 mg Oral Q6H PRN Bayron Owens MD        Or    acetaminophen (TYLENOL) suppository 650 mg  650 mg Rectal Q6H PRN  Active Member of Clubs or Organizations:     Attends Club or Organization Meetings:     Marital Status:    Intimate Partner Violence:     Fear of Current or Ex-Partner:     Emotionally Abused:     Physically Abused:     Sexually Abused:        Family History:   History reviewed. No pertinent family history. REVIEW OF SYSTEMS:    Constitutional: Negative for chills. Negative for fever. HENT: Negative for congestion. Negative for rhinorrhea. Respiratory: Negative for cough. Negative for shortness of breath. Negative for wheezing. Cardiovascular: Negative for chest pain. Hx of hypertension  Gastrointestinal: as per HPI  Genitourinary: Negative for difficulty urinating. Neurological: Negative for dizziness, syncope and numbness. Hematological: Does not bruise/bleed easily. Takes ASA      PHYSICAL EXAM:  Vitals:    07/21/21 1814 07/21/21 1944 07/21/21 2053 07/22/21 0217   BP: (!) 188/88 (!) 171/74 (!) 161/87 129/84   Pulse: 64 58 66 61   Resp: 18 16 18 16   Temp: 98.3 °F (36.8 °C) 98.1 °F (36.7 °C)  98.4 °F (36.9 °C)   TempSrc: Oral Oral  Oral   SpO2: 97% 97% 97% 97%   Weight:    176 lb 4.8 oz (80 kg)   Height:    5' 8\" (1.727 m)       Physical Exam  General: awake, alert, in no acute distress  HEENT: mucous membranes moist  Respiratory: normal effort, no wheezes appreciated  CV: appears well perfused, regular rate and rhythm  Abdomen: Soft, non-tender, non-distended. No guarding or rebound tenderness.   Skin: warm and dry  Extremities: atraumatic  Neuro: no focal deficits noted  Psych: mood normal        DATA:    Lab Results   Component Value Date    WBC 5.2 07/22/2021    HGB 14.6 07/22/2021    HCT 44.8 07/22/2021    MCV 80.1 07/22/2021     07/22/2021     Lab Results   Component Value Date     07/22/2021    K 3.8 07/22/2021     07/22/2021    CO2 30 07/22/2021    BUN 14 07/22/2021    CREATININE 1.2 07/22/2021    GLUCOSE 106 07/22/2021    CALCIUM 8.9 07/22/2021 IMPRESSION:    82 y.o. male with choledocholithiasis.     Patient Active Problem List:     Choledocholithiasis        PLAN:  - agree with ERCP by GI  - will plan for laparoscopic cholecystectomy this admission  - npo at midnight tonight        Electronically signed by Silvia Gutierrez MD on 7/22/2021 at 10:08 AM

## 2021-07-22 NOTE — ANESTHESIA PRE PROCEDURE
Department of Anesthesiology  Preprocedure Note       Name:  Irina Lee   Age:  82 y.o.  :  1935                                          MRN:  [id number]         Date:  2021      Surgeon: Karolina Villegas):  Girma Kenny MD    Procedure: Procedure(s):  CHOLECYSTECTOMY LAPAROSCOPIC    Medications prior to admission:   Prior to Admission medications    Not on File       Current medications:    No current facility-administered medications for this visit. No current outpatient medications on file.      Facility-Administered Medications Ordered in Other Visits   Medication Dose Route Frequency Provider Last Rate Last Admin    ampicillin-sulbactam (UNASYN) 1,500 mg in sodium chloride 0.9 % 50 mL IVPB  1,500 mg Intravenous Q6H Alexandro Blue  mL/hr at 21 1723 1,500 mg at 21 1723    indomethacin (INDOCIN) 50 MG suppository 100 mg  100 mg Rectal Once Natalio Ramos MD        lactated ringers infusion   Intravenous Continuous Chinyere Rangel  mL/hr at 21 1327 Restarted at 21 1547    pantoprazole (PROTONIX) injection 40 mg  40 mg Intravenous Daily Natalio Ramos MD        lactated ringers infusion   Intravenous Continuous Natalio Ramos  mL/hr at 21 1611 New Bag at 21 1611    sodium chloride flush 0.9 % injection 5-40 mL  5-40 mL Intravenous 2 times per day Archana SERVIN MD   5 mL at 21 1010    sodium chloride flush 0.9 % injection 5-40 mL  5-40 mL Intravenous PRN Boogie Lan MD        0.9 % sodium chloride infusion  25 mL Intravenous PRN Boogie Lan MD        ondansetron (ZOFRAN-ODT) disintegrating tablet 4 mg  4 mg Oral Q8H PRN Boogie Lan MD        Or    ondansetron [facility]) injection 4 mg  4 mg Intravenous Q6H PRN Boogie Lan MD   4 mg at 21 0955    polyethylene glycol (GLYCOLAX) packet 17 g  17 g Oral Daily PRN Boogie Lan MD        acetaminophen (TYLENOL) tablet 650 mg  650 mg Oral Q6H PRN Femi Gambino MD        Or    acetaminophen (TYLENOL) suppository 650 mg  650 mg Rectal Q6H PRN Femi Gambino MD        morphine (PF) injection 2 mg  2 mg Intravenous Q6H PRN Femi Gambino MD   2 mg at 07/22/21 0955    hydrALAZINE (APRESOLINE) 10 mg in sodium chloride 0.9 % 50 mL ivpb  10 mg Intravenous Q6H PRN Femi Gambino MD        [START ON 7/23/2021] enoxaparin (LOVENOX) injection 40 mg  40 mg Subcutaneous Daily Gloria Moran MD        metoprolol tartrate (LOPRESSOR) tablet 25 mg  25 mg Oral BID Femi Gambino MD   25 mg at 07/21/21 2125    amLODIPine (NORVASC) tablet 10 mg  10 mg Oral Daily Femi Gambino MD   10 mg at 07/21/21 2003    tamsulosin (FLOMAX) capsule 0.4 mg  0.4 mg Oral Daily Femi Gambino MD   0.4 mg at 07/21/21 2001       Allergies:  No Known Allergies    Problem List:    Patient Active Problem List   Diagnosis Code    Choledocholithiasis K80.50       Past Medical History:        Diagnosis Date    Essential hypertension        Past Surgical History:        Procedure Laterality Date    CARPAL TUNNEL RELEASE      INGUINAL HERNIA REPAIR      ROTATOR CUFF REPAIR Right     SHOULDER ARTHROPLASTY Left        Social History:    Social History     Tobacco Use    Smoking status: Not on file   Substance Use Topics    Alcohol use: Not on file                                Counseling given: Not Answered      Vital Signs (Current): There were no vitals filed for this visit.                                            BP Readings from Last 3 Encounters:   07/22/21 (!) 151/91   07/22/21 134/88       NPO Status:                                                                                 BMI:   Wt Readings from Last 3 Encounters:   07/22/21 176 lb 4.8 oz (80 kg)     There is no height or weight on file to calculate BMI.    CBC:   Lab Results   Component Value Date    WBC 5.2 07/22/2021    RBC 5.59 07/22/2021    HGB 14.6 07/22/2021    HCT 44.8 patient. Plan discussed with CRNA.     Attending anesthesiologist reviewed and agrees with LENA Bearden - CANDACE   7/22/2021

## 2021-07-22 NOTE — BRIEF OP NOTE
Brief Postoperative Note      Noemi Edmondson is a 82 y.o. male     Pre-operative Diagnosis:CBD STONES    Post-operative Diagnosis:  TIGHT STRICTURE AT PYLORIC CHANNEL UNABLE TO PASS DUODENOSCOPE INTO THE 2ND PORTION OF DUODENUM DESPITE DILATING WITH BALLOON 8MM-15MM    Procedure: EGD WITH BALLOON DILATION OF PYLORIC CHANNEL    Anesthesia: GEN    Surgeons/Assistants:Alexandro Blue MD     Estimated Blood Loss: NONE    Complications: None    Specimens: were not obtained    REC  CPM WILL D/W SURGICAL CONSULATANT  Adam Renae MD   7/22/2021   3:40 PM

## 2021-07-23 ENCOUNTER — APPOINTMENT (OUTPATIENT)
Dept: GENERAL RADIOLOGY | Age: 86
DRG: 415 | End: 2021-07-23
Attending: INTERNAL MEDICINE
Payer: MEDICARE

## 2021-07-23 ENCOUNTER — ANESTHESIA (OUTPATIENT)
Dept: OPERATING ROOM | Age: 86
DRG: 415 | End: 2021-07-23
Payer: MEDICARE

## 2021-07-23 VITALS
TEMPERATURE: 97.8 F | DIASTOLIC BLOOD PRESSURE: 80 MMHG | OXYGEN SATURATION: 99 % | SYSTOLIC BLOOD PRESSURE: 115 MMHG | RESPIRATION RATE: 26 BRPM

## 2021-07-23 LAB
ALBUMIN SERPL-MCNC: 3.7 GM/DL (ref 3.4–5)
ALP BLD-CCNC: 228 IU/L (ref 40–128)
ALT SERPL-CCNC: 607 U/L (ref 10–40)
ANION GAP SERPL CALCULATED.3IONS-SCNC: 14 MMOL/L (ref 4–16)
AST SERPL-CCNC: 306 IU/L (ref 15–37)
BASOPHILS ABSOLUTE: 0.1 K/CU MM
BASOPHILS RELATIVE PERCENT: 1.1 % (ref 0–1)
BILIRUB SERPL-MCNC: 1.2 MG/DL (ref 0–1)
BUN BLDV-MCNC: 15 MG/DL (ref 6–23)
CALCIUM SERPL-MCNC: 8.8 MG/DL (ref 8.3–10.6)
CHLORIDE BLD-SCNC: 100 MMOL/L (ref 99–110)
CO2: 26 MMOL/L (ref 21–32)
CREAT SERPL-MCNC: 1.2 MG/DL (ref 0.9–1.3)
DIFFERENTIAL TYPE: ABNORMAL
EOSINOPHILS ABSOLUTE: 0.1 K/CU MM
EOSINOPHILS RELATIVE PERCENT: 1.3 % (ref 0–3)
GFR AFRICAN AMERICAN: >60 ML/MIN/1.73M2
GFR NON-AFRICAN AMERICAN: 58 ML/MIN/1.73M2
GLUCOSE BLD-MCNC: 87 MG/DL (ref 70–99)
HCT VFR BLD CALC: 46.1 % (ref 42–52)
HEMOGLOBIN: 14.7 GM/DL (ref 13.5–18)
IMMATURE NEUTROPHIL %: 2.9 % (ref 0–0.43)
LYMPHOCYTES ABSOLUTE: 0.8 K/CU MM
LYMPHOCYTES RELATIVE PERCENT: 15 % (ref 24–44)
MCH RBC QN AUTO: 25.6 PG (ref 27–31)
MCHC RBC AUTO-ENTMCNC: 31.9 % (ref 32–36)
MCV RBC AUTO: 80.2 FL (ref 78–100)
MONOCYTES ABSOLUTE: 0.7 K/CU MM
MONOCYTES RELATIVE PERCENT: 11.6 % (ref 0–4)
NUCLEATED RBC %: 0 %
PDW BLD-RTO: 16.1 % (ref 11.7–14.9)
PLATELET # BLD: 219 K/CU MM (ref 140–440)
PMV BLD AUTO: 10.4 FL (ref 7.5–11.1)
POTASSIUM SERPL-SCNC: 3.7 MMOL/L (ref 3.5–5.1)
RBC # BLD: 5.75 M/CU MM (ref 4.6–6.2)
SEGMENTED NEUTROPHILS ABSOLUTE COUNT: 3.8 K/CU MM
SEGMENTED NEUTROPHILS RELATIVE PERCENT: 68.1 % (ref 36–66)
SODIUM BLD-SCNC: 140 MMOL/L (ref 135–145)
TOTAL IMMATURE NEUTOROPHIL: 0.16 K/CU MM
TOTAL NUCLEATED RBC: 0 K/CU MM
TOTAL PROTEIN: 5.8 GM/DL (ref 6.4–8.2)
WBC # BLD: 5.6 K/CU MM (ref 4–10.5)

## 2021-07-23 PROCEDURE — 2500000003 HC RX 250 WO HCPCS: Performed by: NURSE ANESTHETIST, CERTIFIED REGISTERED

## 2021-07-23 PROCEDURE — 36415 COLL VENOUS BLD VENIPUNCTURE: CPT

## 2021-07-23 PROCEDURE — 2580000003 HC RX 258: Performed by: ANESTHESIOLOGY

## 2021-07-23 PROCEDURE — 47550 BILE DUCT ENDOSCOPY ADD-ON: CPT | Performed by: SURGERY

## 2021-07-23 PROCEDURE — 6370000000 HC RX 637 (ALT 250 FOR IP): Performed by: INTERNAL MEDICINE

## 2021-07-23 PROCEDURE — 94761 N-INVAS EAR/PLS OXIMETRY MLT: CPT

## 2021-07-23 PROCEDURE — 2580000003 HC RX 258: Performed by: SURGERY

## 2021-07-23 PROCEDURE — 0FT40ZZ RESECTION OF GALLBLADDER, OPEN APPROACH: ICD-10-PCS | Performed by: SURGERY

## 2021-07-23 PROCEDURE — C9113 INJ PANTOPRAZOLE SODIUM, VIA: HCPCS | Performed by: SPECIALIST

## 2021-07-23 PROCEDURE — 3700000001 HC ADD 15 MINUTES (ANESTHESIA): Performed by: SURGERY

## 2021-07-23 PROCEDURE — 7100000000 HC PACU RECOVERY - FIRST 15 MIN: Performed by: SURGERY

## 2021-07-23 PROCEDURE — 80053 COMPREHEN METABOLIC PANEL: CPT

## 2021-07-23 PROCEDURE — 64488 TAP BLOCK BI INJECTION: CPT | Performed by: ANESTHESIOLOGY

## 2021-07-23 PROCEDURE — 6360000002 HC RX W HCPCS: Performed by: SURGERY

## 2021-07-23 PROCEDURE — 2580000003 HC RX 258: Performed by: INTERNAL MEDICINE

## 2021-07-23 PROCEDURE — 2709999900 HC NON-CHARGEABLE SUPPLY: Performed by: SURGERY

## 2021-07-23 PROCEDURE — 3600000004 HC SURGERY LEVEL 4 BASE: Performed by: SURGERY

## 2021-07-23 PROCEDURE — 6360000002 HC RX W HCPCS: Performed by: INTERNAL MEDICINE

## 2021-07-23 PROCEDURE — 3700000000 HC ANESTHESIA ATTENDED CARE: Performed by: SURGERY

## 2021-07-23 PROCEDURE — 6360000002 HC RX W HCPCS: Performed by: SPECIALIST

## 2021-07-23 PROCEDURE — 47610 REMOVAL OF GALLBLADDER: CPT | Performed by: SURGERY

## 2021-07-23 PROCEDURE — 6360000004 HC RX CONTRAST MEDICATION: Performed by: SURGERY

## 2021-07-23 PROCEDURE — 6370000000 HC RX 637 (ALT 250 FOR IP): Performed by: SURGERY

## 2021-07-23 PROCEDURE — 88304 TISSUE EXAM BY PATHOLOGIST: CPT | Performed by: PATHOLOGY

## 2021-07-23 PROCEDURE — 1200000000 HC SEMI PRIVATE

## 2021-07-23 PROCEDURE — 6360000002 HC RX W HCPCS: Performed by: NURSE ANESTHETIST, CERTIFIED REGISTERED

## 2021-07-23 PROCEDURE — BF131ZZ FLUOROSCOPY OF GALLBLADDER AND BILE DUCTS USING LOW OSMOLAR CONTRAST: ICD-10-PCS | Performed by: SURGERY

## 2021-07-23 PROCEDURE — 85025 COMPLETE CBC W/AUTO DIFF WBC: CPT

## 2021-07-23 PROCEDURE — 88300 SURGICAL PATH GROSS: CPT | Performed by: PATHOLOGY

## 2021-07-23 PROCEDURE — 6360000002 HC RX W HCPCS: Performed by: ANESTHESIOLOGY

## 2021-07-23 PROCEDURE — 0FJB8ZZ INSPECTION OF HEPATOBILIARY DUCT, VIA NATURAL OR ARTIFICIAL OPENING ENDOSCOPIC: ICD-10-PCS | Performed by: SURGERY

## 2021-07-23 PROCEDURE — 2500000003 HC RX 250 WO HCPCS: Performed by: SURGERY

## 2021-07-23 PROCEDURE — 76000 FLUOROSCOPY <1 HR PHYS/QHP: CPT

## 2021-07-23 PROCEDURE — 3600000014 HC SURGERY LEVEL 4 ADDTL 15MIN: Performed by: SURGERY

## 2021-07-23 PROCEDURE — 7100000001 HC PACU RECOVERY - ADDTL 15 MIN: Performed by: SURGERY

## 2021-07-23 PROCEDURE — 2580000003 HC RX 258: Performed by: SPECIALIST

## 2021-07-23 RX ORDER — MORPHINE SULFATE 4 MG/ML
4 INJECTION, SOLUTION INTRAMUSCULAR; INTRAVENOUS EVERY 4 HOURS PRN
Status: DISCONTINUED | OUTPATIENT
Start: 2021-07-23 | End: 2021-07-26 | Stop reason: HOSPADM

## 2021-07-23 RX ORDER — FENTANYL CITRATE 50 UG/ML
INJECTION, SOLUTION INTRAMUSCULAR; INTRAVENOUS PRN
Status: DISCONTINUED | OUTPATIENT
Start: 2021-07-23 | End: 2021-07-23 | Stop reason: SDUPTHER

## 2021-07-23 RX ORDER — PROPOFOL 10 MG/ML
INJECTION, EMULSION INTRAVENOUS PRN
Status: DISCONTINUED | OUTPATIENT
Start: 2021-07-23 | End: 2021-07-23 | Stop reason: SDUPTHER

## 2021-07-23 RX ORDER — MORPHINE SULFATE 2 MG/ML
2 INJECTION, SOLUTION INTRAMUSCULAR; INTRAVENOUS EVERY 4 HOURS PRN
Status: DISCONTINUED | OUTPATIENT
Start: 2021-07-23 | End: 2021-07-26 | Stop reason: HOSPADM

## 2021-07-23 RX ORDER — ONDANSETRON 2 MG/ML
4 INJECTION INTRAMUSCULAR; INTRAVENOUS
Status: DISCONTINUED | OUTPATIENT
Start: 2021-07-23 | End: 2021-07-23 | Stop reason: HOSPADM

## 2021-07-23 RX ORDER — ONDANSETRON 2 MG/ML
INJECTION INTRAMUSCULAR; INTRAVENOUS PRN
Status: DISCONTINUED | OUTPATIENT
Start: 2021-07-23 | End: 2021-07-23 | Stop reason: SDUPTHER

## 2021-07-23 RX ORDER — ROPIVACAINE HYDROCHLORIDE 5 MG/ML
INJECTION, SOLUTION EPIDURAL; INFILTRATION; PERINEURAL
Status: DISCONTINUED | OUTPATIENT
Start: 2021-07-23 | End: 2021-07-23 | Stop reason: SDUPTHER

## 2021-07-23 RX ORDER — FENTANYL CITRATE 50 UG/ML
25 INJECTION, SOLUTION INTRAMUSCULAR; INTRAVENOUS EVERY 5 MIN PRN
Status: DISCONTINUED | OUTPATIENT
Start: 2021-07-23 | End: 2021-07-23 | Stop reason: HOSPADM

## 2021-07-23 RX ORDER — SODIUM CHLORIDE, SODIUM LACTATE, POTASSIUM CHLORIDE, CALCIUM CHLORIDE 600; 310; 30; 20 MG/100ML; MG/100ML; MG/100ML; MG/100ML
INJECTION, SOLUTION INTRAVENOUS CONTINUOUS
Status: DISCONTINUED | OUTPATIENT
Start: 2021-07-23 | End: 2021-07-24

## 2021-07-23 RX ORDER — HYDRALAZINE HYDROCHLORIDE 20 MG/ML
INJECTION INTRAMUSCULAR; INTRAVENOUS PRN
Status: DISCONTINUED | OUTPATIENT
Start: 2021-07-23 | End: 2021-07-23 | Stop reason: SDUPTHER

## 2021-07-23 RX ORDER — ROCURONIUM BROMIDE 10 MG/ML
INJECTION, SOLUTION INTRAVENOUS PRN
Status: DISCONTINUED | OUTPATIENT
Start: 2021-07-23 | End: 2021-07-23 | Stop reason: SDUPTHER

## 2021-07-23 RX ORDER — FENTANYL CITRATE 50 UG/ML
50 INJECTION, SOLUTION INTRAMUSCULAR; INTRAVENOUS EVERY 5 MIN PRN
Status: DISCONTINUED | OUTPATIENT
Start: 2021-07-23 | End: 2021-07-23 | Stop reason: HOSPADM

## 2021-07-23 RX ORDER — OXYCODONE HYDROCHLORIDE AND ACETAMINOPHEN 5; 325 MG/1; MG/1
2 TABLET ORAL EVERY 4 HOURS PRN
Status: DISCONTINUED | OUTPATIENT
Start: 2021-07-23 | End: 2021-07-26 | Stop reason: HOSPADM

## 2021-07-23 RX ORDER — LABETALOL HYDROCHLORIDE 5 MG/ML
5 INJECTION, SOLUTION INTRAVENOUS EVERY 10 MIN PRN
Status: DISCONTINUED | OUTPATIENT
Start: 2021-07-23 | End: 2021-07-23 | Stop reason: HOSPADM

## 2021-07-23 RX ORDER — HYDRALAZINE HYDROCHLORIDE 20 MG/ML
5 INJECTION INTRAMUSCULAR; INTRAVENOUS EVERY 10 MIN PRN
Status: DISCONTINUED | OUTPATIENT
Start: 2021-07-23 | End: 2021-07-23 | Stop reason: HOSPADM

## 2021-07-23 RX ORDER — OXYCODONE HYDROCHLORIDE AND ACETAMINOPHEN 5; 325 MG/1; MG/1
1 TABLET ORAL EVERY 4 HOURS PRN
Status: DISCONTINUED | OUTPATIENT
Start: 2021-07-23 | End: 2021-07-26 | Stop reason: HOSPADM

## 2021-07-23 RX ORDER — EPHEDRINE SULFATE 50 MG/ML
INJECTION INTRAVENOUS PRN
Status: DISCONTINUED | OUTPATIENT
Start: 2021-07-23 | End: 2021-07-23 | Stop reason: SDUPTHER

## 2021-07-23 RX ORDER — CEFAZOLIN SODIUM 2 G/100ML
2000 INJECTION, SOLUTION INTRAVENOUS ONCE
Status: COMPLETED | OUTPATIENT
Start: 2021-07-23 | End: 2021-07-23

## 2021-07-23 RX ORDER — PHENYLEPHRINE HCL IN 0.9% NACL 1 MG/10 ML
SYRINGE (ML) INTRAVENOUS PRN
Status: DISCONTINUED | OUTPATIENT
Start: 2021-07-23 | End: 2021-07-23 | Stop reason: SDUPTHER

## 2021-07-23 RX ORDER — DEXAMETHASONE SODIUM PHOSPHATE 4 MG/ML
INJECTION, SOLUTION INTRA-ARTICULAR; INTRALESIONAL; INTRAMUSCULAR; INTRAVENOUS; SOFT TISSUE PRN
Status: DISCONTINUED | OUTPATIENT
Start: 2021-07-23 | End: 2021-07-23 | Stop reason: SDUPTHER

## 2021-07-23 RX ORDER — LIDOCAINE HYDROCHLORIDE 20 MG/ML
INJECTION, SOLUTION INTRAVENOUS PRN
Status: DISCONTINUED | OUTPATIENT
Start: 2021-07-23 | End: 2021-07-23 | Stop reason: SDUPTHER

## 2021-07-23 RX ADMIN — SODIUM CHLORIDE 1500 MG: 900 INJECTION INTRAVENOUS at 19:13

## 2021-07-23 RX ADMIN — Medication 100 MCG: at 14:17

## 2021-07-23 RX ADMIN — SODIUM CHLORIDE 1500 MG: 900 INJECTION INTRAVENOUS at 05:41

## 2021-07-23 RX ADMIN — PANTOPRAZOLE SODIUM 40 MG: 40 INJECTION, POWDER, FOR SOLUTION INTRAVENOUS at 09:41

## 2021-07-23 RX ADMIN — HYDRALAZINE HYDROCHLORIDE 4 MG: 20 INJECTION, SOLUTION INTRAMUSCULAR; INTRAVENOUS at 16:32

## 2021-07-23 RX ADMIN — SODIUM CHLORIDE, PRESERVATIVE FREE 10 ML: 5 INJECTION INTRAVENOUS at 09:41

## 2021-07-23 RX ADMIN — METRONIDAZOLE 500 MG: 500 INJECTION, SOLUTION INTRAVENOUS at 14:32

## 2021-07-23 RX ADMIN — ROCURONIUM BROMIDE 10 MG: 10 INJECTION INTRAVENOUS at 15:24

## 2021-07-23 RX ADMIN — SODIUM CHLORIDE, POTASSIUM CHLORIDE, SODIUM LACTATE AND CALCIUM CHLORIDE: 600; 310; 30; 20 INJECTION, SOLUTION INTRAVENOUS at 17:37

## 2021-07-23 RX ADMIN — FENTANYL CITRATE 50 MCG: 50 INJECTION, SOLUTION INTRAMUSCULAR; INTRAVENOUS at 13:58

## 2021-07-23 RX ADMIN — AMLODIPINE BESYLATE 10 MG: 10 TABLET ORAL at 09:40

## 2021-07-23 RX ADMIN — OXYCODONE HYDROCHLORIDE AND ACETAMINOPHEN 2 TABLET: 5; 325 TABLET ORAL at 21:37

## 2021-07-23 RX ADMIN — FENTANYL CITRATE 50 MCG: 50 INJECTION, SOLUTION INTRAMUSCULAR; INTRAVENOUS at 14:32

## 2021-07-23 RX ADMIN — SODIUM CHLORIDE, POTASSIUM CHLORIDE, SODIUM LACTATE AND CALCIUM CHLORIDE: 600; 310; 30; 20 INJECTION, SOLUTION INTRAVENOUS at 16:12

## 2021-07-23 RX ADMIN — LIDOCAINE HYDROCHLORIDE 100 MG: 20 INJECTION, SOLUTION INTRAVENOUS at 13:58

## 2021-07-23 RX ADMIN — MORPHINE SULFATE 2 MG: 2 INJECTION, SOLUTION INTRAMUSCULAR; INTRAVENOUS at 19:12

## 2021-07-23 RX ADMIN — METOPROLOL TARTRATE 25 MG: 25 TABLET, FILM COATED ORAL at 21:31

## 2021-07-23 RX ADMIN — ONDANSETRON 4 MG: 2 INJECTION INTRAMUSCULAR; INTRAVENOUS at 14:40

## 2021-07-23 RX ADMIN — PROPOFOL 160 MG: 10 INJECTION, EMULSION INTRAVENOUS at 13:58

## 2021-07-23 RX ADMIN — SODIUM CHLORIDE, PRESERVATIVE FREE 10 ML: 5 INJECTION INTRAVENOUS at 21:31

## 2021-07-23 RX ADMIN — ROPIVACAINE HYDROCHLORIDE 25 ML: 5 INJECTION, SOLUTION EPIDURAL; INFILTRATION; PERINEURAL at 16:50

## 2021-07-23 RX ADMIN — TAMSULOSIN HYDROCHLORIDE 0.4 MG: 0.4 CAPSULE ORAL at 09:40

## 2021-07-23 RX ADMIN — DEXAMETHASONE SODIUM PHOSPHATE 8 MG: 4 INJECTION, SOLUTION INTRAMUSCULAR; INTRAVENOUS at 14:00

## 2021-07-23 RX ADMIN — SODIUM CHLORIDE 1500 MG: 900 INJECTION INTRAVENOUS at 00:05

## 2021-07-23 RX ADMIN — EPHEDRINE SULFATE 10 MG: 50 INJECTION INTRAVENOUS at 15:18

## 2021-07-23 RX ADMIN — METOPROLOL TARTRATE 25 MG: 25 TABLET, FILM COATED ORAL at 09:40

## 2021-07-23 RX ADMIN — SUGAMMADEX 200 MG: 100 INJECTION, SOLUTION INTRAVENOUS at 16:53

## 2021-07-23 RX ADMIN — ROCURONIUM BROMIDE 50 MG: 10 INJECTION INTRAVENOUS at 14:00

## 2021-07-23 RX ADMIN — CEFAZOLIN SODIUM 2000 MG: 2 INJECTION, SOLUTION INTRAVENOUS at 14:17

## 2021-07-23 RX ADMIN — MORPHINE SULFATE 4 MG: 4 INJECTION, SOLUTION INTRAMUSCULAR; INTRAVENOUS at 23:27

## 2021-07-23 ASSESSMENT — PULMONARY FUNCTION TESTS
PIF_VALUE: 1
PIF_VALUE: 14
PIF_VALUE: 18
PIF_VALUE: 16
PIF_VALUE: 15
PIF_VALUE: 16
PIF_VALUE: 18
PIF_VALUE: 14
PIF_VALUE: 16
PIF_VALUE: 15
PIF_VALUE: 16
PIF_VALUE: 18
PIF_VALUE: 15
PIF_VALUE: 18
PIF_VALUE: 14
PIF_VALUE: 14
PIF_VALUE: 15
PIF_VALUE: 16
PIF_VALUE: 15
PIF_VALUE: 18
PIF_VALUE: 18
PIF_VALUE: 16
PIF_VALUE: 14
PIF_VALUE: 15
PIF_VALUE: 18
PIF_VALUE: 15
PIF_VALUE: 16
PIF_VALUE: 18
PIF_VALUE: 1
PIF_VALUE: 16
PIF_VALUE: 19
PIF_VALUE: 18
PIF_VALUE: 15
PIF_VALUE: 1
PIF_VALUE: 15
PIF_VALUE: 15
PIF_VALUE: 16
PIF_VALUE: 16
PIF_VALUE: 15
PIF_VALUE: 16
PIF_VALUE: 14
PIF_VALUE: 15
PIF_VALUE: 15
PIF_VALUE: 16
PIF_VALUE: 16
PIF_VALUE: 15
PIF_VALUE: 19
PIF_VALUE: 15
PIF_VALUE: 16
PIF_VALUE: 16
PIF_VALUE: 15
PIF_VALUE: 15
PIF_VALUE: 16
PIF_VALUE: 18
PIF_VALUE: 16
PIF_VALUE: 15
PIF_VALUE: 18
PIF_VALUE: 18
PIF_VALUE: 14
PIF_VALUE: 15
PIF_VALUE: 14
PIF_VALUE: 18
PIF_VALUE: 27
PIF_VALUE: 15
PIF_VALUE: 18
PIF_VALUE: 16
PIF_VALUE: 18
PIF_VALUE: 15
PIF_VALUE: 16
PIF_VALUE: 18
PIF_VALUE: 16
PIF_VALUE: 16
PIF_VALUE: 1
PIF_VALUE: 15
PIF_VALUE: 16
PIF_VALUE: 18
PIF_VALUE: 16
PIF_VALUE: 18
PIF_VALUE: 15
PIF_VALUE: 16
PIF_VALUE: 14
PIF_VALUE: 15
PIF_VALUE: 18
PIF_VALUE: 14
PIF_VALUE: 16
PIF_VALUE: 12
PIF_VALUE: 16
PIF_VALUE: 16
PIF_VALUE: 18
PIF_VALUE: 14
PIF_VALUE: 15
PIF_VALUE: 16
PIF_VALUE: 16
PIF_VALUE: 0
PIF_VALUE: 18
PIF_VALUE: 18
PIF_VALUE: 16
PIF_VALUE: 0
PIF_VALUE: 14
PIF_VALUE: 14
PIF_VALUE: 20
PIF_VALUE: 18
PIF_VALUE: 18
PIF_VALUE: 16
PIF_VALUE: 16
PIF_VALUE: 15
PIF_VALUE: 18
PIF_VALUE: 15
PIF_VALUE: 14
PIF_VALUE: 18
PIF_VALUE: 15
PIF_VALUE: 16
PIF_VALUE: 2
PIF_VALUE: 0
PIF_VALUE: 14
PIF_VALUE: 16
PIF_VALUE: 16
PIF_VALUE: 15
PIF_VALUE: 16
PIF_VALUE: 14
PIF_VALUE: 18
PIF_VALUE: 14
PIF_VALUE: 15
PIF_VALUE: 16
PIF_VALUE: 14
PIF_VALUE: 15
PIF_VALUE: 18
PIF_VALUE: 16
PIF_VALUE: 15
PIF_VALUE: 16
PIF_VALUE: 18
PIF_VALUE: 16
PIF_VALUE: 16
PIF_VALUE: 14
PIF_VALUE: 16
PIF_VALUE: 0
PIF_VALUE: 16
PIF_VALUE: 15
PIF_VALUE: 14
PIF_VALUE: 18
PIF_VALUE: 0
PIF_VALUE: 14
PIF_VALUE: 14
PIF_VALUE: 16
PIF_VALUE: 1
PIF_VALUE: 0
PIF_VALUE: 14
PIF_VALUE: 15
PIF_VALUE: 16
PIF_VALUE: 0
PIF_VALUE: 16
PIF_VALUE: 16
PIF_VALUE: 1
PIF_VALUE: 26
PIF_VALUE: 15
PIF_VALUE: 15
PIF_VALUE: 13
PIF_VALUE: 16
PIF_VALUE: 1
PIF_VALUE: 15
PIF_VALUE: 15
PIF_VALUE: 16
PIF_VALUE: 16
PIF_VALUE: 14
PIF_VALUE: 16
PIF_VALUE: 15
PIF_VALUE: 15
PIF_VALUE: 16

## 2021-07-23 ASSESSMENT — PAIN SCALES - GENERAL
PAINLEVEL_OUTOF10: 0
PAINLEVEL_OUTOF10: 7
PAINLEVEL_OUTOF10: 0
PAINLEVEL_OUTOF10: 7
PAINLEVEL_OUTOF10: 7

## 2021-07-23 NOTE — PROGRESS NOTES
Progress Note      Subjective:   Chief complaint: Abdominal pain    Interval History:   No issues overnight, scheduled for cholecystectomy today    Review of systems:           Medications:   Scheduled Meds:   ampicillin-sulbactam  1,500 mg Intravenous Q6H    indomethacin  100 mg Rectal Once    pantoprazole  40 mg Intravenous Daily    sodium chloride flush  5-40 mL Intravenous 2 times per day    enoxaparin  40 mg Subcutaneous Daily    metoprolol tartrate  25 mg Oral BID    amLODIPine  10 mg Oral Daily    tamsulosin  0.4 mg Oral Daily     Continuous Infusions:   lactated ringers 100 mL/hr at 07/23/21 1737    lactated ringers 100 mL/hr at 07/22/21 1611    sodium chloride         Objective:     Vital Signs  Temp: 97.4 °F (36.3 °C)  Pulse: 93  Resp: 16  BP: (!) 140/73  SpO2: 92 %  O2 Device: None (Room air)  O2 Flow Rate (L/min): 2 L/min    Vital signs reviewed in electronic charts. Physical exam     Constitutional:  Well developed, well nourished, no acute distress. Eyes:  PERRL, conjunctiva normal, EOMI. HENT:  Atraumatic, external ears normal, external nose/nares normal, oropharynx moist, no pharyngeal exudates. Neck:  Supple. No JVD or thyromegaly. Respiratory:  No respiratory distress, normal breath sounds, no rales, no wheezing. Cardiovascular:  Normal rate, normal rhythm, no murmurs, no gallops, no rubs. GI:  Soft, nondistended, normal bowel sounds, nontender, no organomegaly, no mass. :  No costovertebral angle tenderness. Musculoskeletal:  No edema, no tenderness, no obvious deformities. Patient is moving all extremities. Integument:  Well hydrated, no rash. Lymphatic:  No cervical or axillary lymphadenopathy noted. Neurologic:  Alert & oriented x 3,  no focal deficits noted. Strength is equal throughout. Psychiatric:  Speech and behavior appropriate.     Results:     Lab Results   Component Value Date    WBC 5.6 07/23/2021    HGB 14.7 07/23/2021    HCT 46.1 07/23/2021    MCV 80.2 07/23/2021     07/23/2021       Lab Results   Component Value Date     07/23/2021    K 3.7 07/23/2021     07/23/2021    CO2 26 07/23/2021    BUN 15 07/23/2021    CREATININE 1.2 07/23/2021    GLUCOSE 87 07/23/2021    CALCIUM 8.8 07/23/2021        Assessment and Plan:      Active Hospital Problems    Diagnosis Date Noted    Choledocholithiasis [K80.50] 07/21/2021     Kin Moore is a 82 y.o.  male  who presents with abdominal pain     1) Acute Symptomatic Cholelithiasis and Choledocholithiasis   -Ultrasound negative for cholecystitis but showed cholelithiasis  -MRCP done at the referring hospital:mild intrahepatic biliary dilatation of the left lobe and multiple filling defects are seen filling the distal 2.5 cm of CBD  -AST/ALT/ALP elevated  -GI on board for ERCP  -Defer to GI/surgery on continued antibiotics  Appreciate GI and surgery recommendations        2) Essential hypertension  -Uncontrolled  -Resume meds     Other chronic medical conditions, medication resumed unless contraindicated     BPH  GERD  Hyperlipidemia    Electronically signed by Martha Browning DO on 7/23/2021 at 6:37 PM

## 2021-07-23 NOTE — ANESTHESIA PROCEDURE NOTES
Peripheral Block    Patient location during procedure: OR  Start time: 7/23/2021 4:42 PM  End time: 7/23/2021 4:50 PM  Staffing  Performed: resident/CRNA   Resident/CRNA: LENA Clemons CRNA  Preanesthetic Checklist  Completed: patient identified, IV checked, site marked, risks and benefits discussed, surgical consent, monitors and equipment checked, pre-op evaluation, timeout performed, anesthesia consent given, oxygen available and patient being monitored  Peripheral Block  Patient position: supine  Prep: ChloraPrep  Patient monitoring: cardiac monitor, continuous pulse ox, continuous capnometry, frequent blood pressure checks and IV access  Block type: Rectus sheath and TAP  Laterality: bilateral  Injection technique: single-shot  Guidance: ultrasound guided  Provider prep: mask and sterile gloves  Needle  Needle type: long-bevel   Needle gauge: 22 G  Needle length: 8 cm  Needle localization: ultrasound guidance  Assessment  Injection assessment: negative aspiration for heme  Slow fractionated injection: yes  Hemodynamics: stable  Additional Notes  25 ml 0.5% ropivacaine diluted in 50 ml NS.  Bilateral rectus sheet plus right tap block  Medications Administered  Ropivacaine (NAROPIN) 0.5% injection, 25 mL  Reason for block: post-op pain management and at surgeon's request

## 2021-07-23 NOTE — BRIEF OP NOTE
Brief Postoperative Note      Patient: Dolores Linton  YOB: 1935  MRN: [id number]    Date of Procedure: 7/23/2021    Pre-Op Diagnosis: choledocholithiasis    Post-Op Diagnosis: Same       Procedure(s):  OPEN COMMON BILE DUCT EXPLORATION WITH CHOLECYSTECTOMY, CHOLEDOCHOSCOPY AND CHOLANGIOGRAM    Surgeon(s):  MD Leon Glass MD    Assistant:  ZITA Tapia student    Anesthesia: General    Estimated Blood Loss (mL): 03XR    Complications: None    Specimens:   ID Type Source Tests Collected by Time Destination   A : gallbladder and contents Tissue Gallbladder SURGICAL PATHOLOGY Mylene Guzman MD 7/23/2021 1534    B : BILLIARY STONES AND CYSTIC DUCT Stone (Calculus) Tissue SURGICAL PATHOLOGY Mylene Guzman MD 7/23/2021 1552        Implants:  * No implants in log *      Drains:   Closed/Suction Drain Lateral RUQ 15 [place] (Active)       Urethral Catheter Double-lumen 16 fr (Active)       Findings: multiple common bile duct and hepatic duct stones.   After duct exploration the common bile duct was cleared of stones on completion cholangiogram.    Electronically signed by Mylene Guzman MD on 7/23/2021 at 4:50 PM

## 2021-07-23 NOTE — PROGRESS NOTES
1709 patient received from the OR monitor placed and alarms on. Report received Agustín CANDACE.  Per report patient received an rectus sheath and TAP block  intraop   1735 resting quietly denies any pain at this time   1745 Report called to [address] prior to transport back to room

## 2021-07-23 NOTE — PROGRESS NOTES
DOING WELL NO ABD COMPLAINTS  VITALS STABLE   LABS NOTED  LFTS IMPROVING  D/W DR David Beverage LAST PM  POSSIBLE OC WITH IOC TODAY  D/W PT

## 2021-07-23 NOTE — OP NOTE
Operative Note      Patient: Mae Verma  YOB: 1935  MRN: [id number]    Date of Procedure: 7/23/2021    Pre-Op Diagnosis: choledocholithiasis    Post-Op Diagnosis: Same       Procedure(s):  OPEN COMMON BILE DUCT EXPLORATION WITH CHOLECYSTECTOMY, CHOLEDOCHOSCOPY AND CHOLANGIOGRAM    Surgeon(s):  Clide Kos, MD Carleen Nyhan, MD Dr. Woodson Barrio and I were co-surgeons during the case. The case was of significant complexity and required 2 surgeons during the procedure for medical decision making as well as performance of the procedure itself. Assistant:   ZITA Teague student    Anesthesia: General    Estimated Blood Loss (mL): 19PI    Complications: None    Specimens:   ID Type Source Tests Collected by Time Destination   A : gallbladder and contents Tissue Gallbladder SURGICAL PATHOLOGY Veronica Burkett MD 7/23/2021 1534    B : BILLIARY STONES AND CYSTIC DUCT Stone (Calculus) Tissue SURGICAL PATHOLOGY Veronica Burkett MD 7/23/2021 1552        Implants:  * No implants in log *      Drains:   Closed/Suction Drain Lateral RUQ 15 [place] (Active)   Site Description Unable to view 07/23/21 1746   Dressing Status Clean;Dry; Intact 07/23/21 1746   Drainage Appearance Dark Red 07/23/21 1746   Status Compressed 07/23/21 1746   Output (ml) 20 ml 07/23/21 1746       [REMOVED] Urethral Catheter Double-lumen 16 fr (Removed)       Findings: multiple common bile duct and hepatic duct stones. After duct exploration the common bile duct was cleared of stones on completion cholangiogram.    Detailed Description of Procedure:     Procedure Details   The patient was seen in the Holding Room. The risks, benefits, complications, treatment options, and expected outcomes were discussed with the patient. The patient concurred with the proposed plan, giving informed consent. The patient was taken to the operating room, identified and the procedure verified as the consent form.  A Time Out was held.    Procedure Description   After induction of anesthesia the abdomen was prepped and draped in a sterile fashion. Alberts catheter was inserted aseptically. A RUQ subcostal incision was made using the scalpel. A Bovie cautery was used to divided the subcutaneous tissue, abdominal wall fascia and musculature. The peritoneum was grasped entered sharply with scalpel without incidence. Bookwalter retractor was placed and exposure was gained to the gallbladder. The gallbladder was taken down from a top down approach using electrocautery and counter traction. Some mild bleeding from the liver was encountered and controlled with electrocautery was well as surgicel and pressure. The cystic duct and cystic artery were isolated and the artery was clip x2 on the stay side and once on the gallbladder side and ligated with scissors. A clip was placed on the gallbladder side of the cystic duct and a ductotomy was made with scissors. Steven catheter was placed and balloon inflated and cholangiogram was taken showing multiple stones in the distal common bile duct as well as the hepatic duct. The catheter was removed and the gallbladder resected by completing transection of the cystic duct. A 1.25cc Benson catheter was placed through the cystic duct intho the common bile duct down to the level of the duodenum. This was inflated then backed out and several stones were removed with several passes of the catheter. Cholangiogram was again obtained and showed good clearance of stones from the distal CBD but some retained stones in the hepatic duct/tree. A small cystoscope was inserted through the cystic duct into the common bile duct and passed to the level of the ampulla which remained free of stones. With some manipulation the scope was then able to be passed upwards to the level of the hepatic bifurcation where a stones were visible.   The scope was withdrawn and one of the stones from the hepatic duct was blown out from the cystic duct due to back pressure from the irrigation from the cystoscope. Another cholangiogram was obtained with a single stone remaining within the biliary tree deep to the bifurcation. The cholangiogram catheter was removed and again the back pressure from the cholangiogram this time forced the stone to be ejected from the cystic duct. A final cholangiogram was taken showing the bile ducts free from stones. The cystic duct was then ligated with 3-0 silk suture and a hemoclip. The abdomen was irrigated and a 15 fr drain was left in the RUQ. The fascia was closed in 2 layers with 0-vicryl running suture deep for the peritoneum and posterior sheath. Looped 1 PDS running suture was used to close the anterior rectus sheath. The wound was irrigated then closed with staples. The drain was secured with a silk stitch. Sterile dressings were then applied    The patient was ultimately transferred to recovery room in stable condition.     Electronically signed by Olvin Sparks MD on 7/23/2021 at 8:48 PM

## 2021-07-24 LAB
ALBUMIN SERPL-MCNC: 2.6 GM/DL (ref 3.4–5)
ALBUMIN SERPL-MCNC: 3.9 GM/DL (ref 3.4–5)
ALP BLD-CCNC: 188 IU/L (ref 40–129)
ALP BLD-CCNC: 391 IU/L (ref 40–128)
ALT SERPL-CCNC: 24 U/L (ref 10–40)
ALT SERPL-CCNC: 492 U/L (ref 10–40)
ANION GAP SERPL CALCULATED.3IONS-SCNC: 12 MMOL/L (ref 4–16)
ANION GAP SERPL CALCULATED.3IONS-SCNC: 7 MMOL/L (ref 4–16)
ANISOCYTOSIS: ABNORMAL
ANISOCYTOSIS: ABNORMAL
AST SERPL-CCNC: 250 IU/L (ref 15–37)
AST SERPL-CCNC: 54 IU/L (ref 15–37)
BANDED NEUTROPHILS ABSOLUTE COUNT: 0.67 K/CU MM
BANDED NEUTROPHILS ABSOLUTE COUNT: 1.57 K/CU MM
BANDED NEUTROPHILS RELATIVE PERCENT: 12 % (ref 5–11)
BANDED NEUTROPHILS RELATIVE PERCENT: 7 % (ref 5–11)
BILIRUB SERPL-MCNC: 0.5 MG/DL (ref 0–1)
BILIRUB SERPL-MCNC: 1 MG/DL (ref 0–1)
BUN BLDV-MCNC: 20 MG/DL (ref 6–23)
BUN BLDV-MCNC: 28 MG/DL (ref 6–23)
CALCIUM SERPL-MCNC: 11.1 MG/DL (ref 8.3–10.6)
CALCIUM SERPL-MCNC: 8.5 MG/DL (ref 8.3–10.6)
CHLORIDE BLD-SCNC: 113 MMOL/L (ref 99–110)
CHLORIDE BLD-SCNC: 99 MMOL/L (ref 99–110)
CO2: 26 MMOL/L (ref 21–32)
CO2: 29 MMOL/L (ref 21–32)
CREAT SERPL-MCNC: 0.7 MG/DL (ref 0.9–1.3)
CREAT SERPL-MCNC: 1.3 MG/DL (ref 0.9–1.3)
DIFFERENTIAL TYPE: ABNORMAL
DIFFERENTIAL TYPE: ABNORMAL
GFR AFRICAN AMERICAN: >60 ML/MIN/1.73M2
GFR AFRICAN AMERICAN: >60 ML/MIN/1.73M2
GFR NON-AFRICAN AMERICAN: 53 ML/MIN/1.73M2
GFR NON-AFRICAN AMERICAN: >60 ML/MIN/1.73M2
GLUCOSE BLD-MCNC: 113 MG/DL (ref 70–99)
GLUCOSE BLD-MCNC: 128 MG/DL (ref 70–99)
HCT VFR BLD CALC: 25 % (ref 42–52)
HCT VFR BLD CALC: 42.8 % (ref 42–52)
HEMOGLOBIN: 14.4 GM/DL (ref 13.5–18)
HEMOGLOBIN: 7.7 GM/DL (ref 13.5–18)
HYPOCHROMIA: ABNORMAL
LYMPHOCYTES ABSOLUTE: 1.2 K/CU MM
LYMPHOCYTES ABSOLUTE: 1.7 K/CU MM
LYMPHOCYTES RELATIVE PERCENT: 13 % (ref 24–44)
LYMPHOCYTES RELATIVE PERCENT: 13 % (ref 24–44)
MAGNESIUM: 1.7 MG/DL (ref 1.8–2.4)
MCH RBC QN AUTO: 26.8 PG (ref 27–31)
MCH RBC QN AUTO: 29.6 PG (ref 27–31)
MCHC RBC AUTO-ENTMCNC: 30.8 % (ref 32–36)
MCHC RBC AUTO-ENTMCNC: 33.6 % (ref 32–36)
MCV RBC AUTO: 79.6 FL (ref 78–100)
MCV RBC AUTO: 96.2 FL (ref 78–100)
METAMYELOCYTES ABSOLUTE COUNT: 0.26 K/CU MM
METAMYELOCYTES PERCENT: 2 %
MONOCYTES ABSOLUTE: 0.5 K/CU MM
MONOCYTES ABSOLUTE: 0.7 K/CU MM
MONOCYTES RELATIVE PERCENT: 4 % (ref 0–4)
MONOCYTES RELATIVE PERCENT: 7 % (ref 0–4)
NUCLEATED RED BLOOD CELLS: 1
PDW BLD-RTO: 16.3 % (ref 11.7–14.9)
PDW BLD-RTO: 19.2 % (ref 11.7–14.9)
PLATELET # BLD: 205 K/CU MM (ref 140–440)
PLATELET # BLD: 251 K/CU MM (ref 140–440)
PLT MORPHOLOGY: ADEQUATE
PLT MORPHOLOGY: ADEQUATE
PMV BLD AUTO: 10.6 FL (ref 7.5–11.1)
PMV BLD AUTO: 9.6 FL (ref 7.5–11.1)
POLYCHROMASIA: ABNORMAL
POLYCHROMASIA: ABNORMAL
POTASSIUM SERPL-SCNC: 3.5 MMOL/L (ref 3.5–5.1)
POTASSIUM SERPL-SCNC: 3.8 MMOL/L (ref 3.5–5.1)
RBC # BLD: 2.6 M/CU MM (ref 4.6–6.2)
RBC # BLD: 5.38 M/CU MM (ref 4.6–6.2)
SEGMENTED NEUTROPHILS ABSOLUTE COUNT: 6.9 K/CU MM
SEGMENTED NEUTROPHILS ABSOLUTE COUNT: 9.1 K/CU MM
SEGMENTED NEUTROPHILS RELATIVE PERCENT: 69 % (ref 36–66)
SEGMENTED NEUTROPHILS RELATIVE PERCENT: 73 % (ref 36–66)
SODIUM BLD-SCNC: 137 MMOL/L (ref 135–145)
SODIUM BLD-SCNC: 149 MMOL/L (ref 135–145)
TOTAL PROTEIN: 5.1 GM/DL (ref 6.4–8.2)
TOTAL PROTEIN: 5.4 GM/DL (ref 6.4–8.2)
WBC # BLD: 13.1 K/CU MM (ref 4–10.5)
WBC # BLD: 9.5 K/CU MM (ref 4–10.5)

## 2021-07-24 PROCEDURE — 2580000003 HC RX 258: Performed by: SURGERY

## 2021-07-24 PROCEDURE — 6370000000 HC RX 637 (ALT 250 FOR IP): Performed by: SURGERY

## 2021-07-24 PROCEDURE — 94761 N-INVAS EAR/PLS OXIMETRY MLT: CPT

## 2021-07-24 PROCEDURE — 6360000002 HC RX W HCPCS: Performed by: SURGERY

## 2021-07-24 PROCEDURE — 83735 ASSAY OF MAGNESIUM: CPT

## 2021-07-24 PROCEDURE — 1200000000 HC SEMI PRIVATE

## 2021-07-24 PROCEDURE — 85027 COMPLETE CBC AUTOMATED: CPT

## 2021-07-24 PROCEDURE — 99024 POSTOP FOLLOW-UP VISIT: CPT | Performed by: SURGERY

## 2021-07-24 PROCEDURE — 80053 COMPREHEN METABOLIC PANEL: CPT

## 2021-07-24 PROCEDURE — 36415 COLL VENOUS BLD VENIPUNCTURE: CPT

## 2021-07-24 PROCEDURE — C9113 INJ PANTOPRAZOLE SODIUM, VIA: HCPCS | Performed by: SURGERY

## 2021-07-24 PROCEDURE — 85007 BL SMEAR W/DIFF WBC COUNT: CPT

## 2021-07-24 RX ADMIN — SODIUM CHLORIDE, PRESERVATIVE FREE 10 ML: 5 INJECTION INTRAVENOUS at 21:10

## 2021-07-24 RX ADMIN — ENOXAPARIN SODIUM 40 MG: 30 INJECTION SUBCUTANEOUS at 12:13

## 2021-07-24 RX ADMIN — AMLODIPINE BESYLATE 10 MG: 10 TABLET ORAL at 09:06

## 2021-07-24 RX ADMIN — MORPHINE SULFATE 4 MG: 4 INJECTION, SOLUTION INTRAMUSCULAR; INTRAVENOUS at 16:59

## 2021-07-24 RX ADMIN — MORPHINE SULFATE 4 MG: 4 INJECTION, SOLUTION INTRAMUSCULAR; INTRAVENOUS at 21:10

## 2021-07-24 RX ADMIN — SODIUM CHLORIDE 1500 MG: 900 INJECTION INTRAVENOUS at 17:03

## 2021-07-24 RX ADMIN — OXYCODONE HYDROCHLORIDE AND ACETAMINOPHEN 1 TABLET: 5; 325 TABLET ORAL at 05:08

## 2021-07-24 RX ADMIN — TAMSULOSIN HYDROCHLORIDE 0.4 MG: 0.4 CAPSULE ORAL at 09:06

## 2021-07-24 RX ADMIN — SODIUM CHLORIDE 1500 MG: 900 INJECTION INTRAVENOUS at 02:11

## 2021-07-24 RX ADMIN — OXYCODONE HYDROCHLORIDE AND ACETAMINOPHEN 2 TABLET: 5; 325 TABLET ORAL at 12:58

## 2021-07-24 RX ADMIN — PANTOPRAZOLE SODIUM 40 MG: 40 INJECTION, POWDER, FOR SOLUTION INTRAVENOUS at 09:03

## 2021-07-24 RX ADMIN — METOPROLOL TARTRATE 25 MG: 25 TABLET, FILM COATED ORAL at 09:06

## 2021-07-24 RX ADMIN — OXYCODONE HYDROCHLORIDE AND ACETAMINOPHEN 2 TABLET: 5; 325 TABLET ORAL at 09:06

## 2021-07-24 RX ADMIN — SODIUM CHLORIDE, POTASSIUM CHLORIDE, SODIUM LACTATE AND CALCIUM CHLORIDE: 600; 310; 30; 20 INJECTION, SOLUTION INTRAVENOUS at 02:11

## 2021-07-24 RX ADMIN — SODIUM CHLORIDE 1500 MG: 900 INJECTION INTRAVENOUS at 06:17

## 2021-07-24 RX ADMIN — METOPROLOL TARTRATE 25 MG: 25 TABLET, FILM COATED ORAL at 21:09

## 2021-07-24 ASSESSMENT — PAIN - FUNCTIONAL ASSESSMENT: PAIN_FUNCTIONAL_ASSESSMENT: PREVENTS OR INTERFERES SOME ACTIVE ACTIVITIES AND ADLS

## 2021-07-24 ASSESSMENT — PAIN SCALES - GENERAL
PAINLEVEL_OUTOF10: 4
PAINLEVEL_OUTOF10: 7
PAINLEVEL_OUTOF10: 8
PAINLEVEL_OUTOF10: 0
PAINLEVEL_OUTOF10: 6
PAINLEVEL_OUTOF10: 7
PAINLEVEL_OUTOF10: 6
PAINLEVEL_OUTOF10: 9

## 2021-07-24 ASSESSMENT — PAIN DESCRIPTION - FREQUENCY: FREQUENCY: CONTINUOUS

## 2021-07-24 ASSESSMENT — PAIN DESCRIPTION - ORIENTATION: ORIENTATION: RIGHT;MID

## 2021-07-24 ASSESSMENT — PAIN DESCRIPTION - ONSET: ONSET: ON-GOING

## 2021-07-24 ASSESSMENT — PAIN DESCRIPTION - LOCATION: LOCATION: ABDOMEN

## 2021-07-24 ASSESSMENT — PAIN DESCRIPTION - PAIN TYPE: TYPE: ACUTE PAIN

## 2021-07-24 ASSESSMENT — PAIN DESCRIPTION - PROGRESSION: CLINICAL_PROGRESSION: GRADUALLY WORSENING

## 2021-07-24 ASSESSMENT — PAIN DESCRIPTION - DESCRIPTORS: DESCRIPTORS: SHARP;ACHING

## 2021-07-24 NOTE — PLAN OF CARE
Problem: Pain:  Goal: Pain level will decrease  Description: Pain level will decrease  7/24/2021 1303 by Andrew Dickson RN  Outcome: Ongoing  7/24/2021 0058 by Tyrone Trevino RN  Outcome: Ongoing  Goal: Control of acute pain  Description: Control of acute pain  7/24/2021 1303 by Andrew Dickson RN  Outcome: Ongoing  7/24/2021 0058 by Tyrone Trevino RN  Outcome: Ongoing  Goal: Control of chronic pain  Description: Control of chronic pain  7/24/2021 1303 by Andrew Dickson RN  Outcome: Ongoing  7/24/2021 0058 by Tyrone Trevino RN  Outcome: Ongoing     Problem: Falls - Risk of:  Goal: Will remain free from falls  Description: Will remain free from falls  7/24/2021 1303 by Andrew Dickson RN  Outcome: Ongoing  7/24/2021 0058 by Tyrone Trevino RN  Outcome: Ongoing  Goal: Absence of physical injury  Description: Absence of physical injury  7/24/2021 1303 by Andrew Dickson RN  Outcome: Ongoing  7/24/2021 0058 by Tyrone Trevino RN  Outcome: Ongoing

## 2021-07-24 NOTE — ANESTHESIA POSTPROCEDURE EVALUATION
Department of Anesthesiology  Postprocedure Note    Patient: Najma Jimenez  MRN: [id number]  YOB: 1935  Date of evaluation: 7/23/2021  Time:  10:32 PM     Procedure Summary     Date: 07/23/21 Room / Location: [address]    Anesthesia Start: 9517 Anesthesia Stop: 6752    Procedure: OPEN COMMON BILE DUCT EXPLORATION WITH CHOLECYSTECTOMY AND CHOLANGIOGRAM (N/A Abdomen) Diagnosis: (-)    Surgeons: Radha Glover MD Responsible Provider: Deepika Delgado MD    Anesthesia Type: general ASA Status: 2          Anesthesia Type: general    Clair Phase I: Clair Score: 9    Clair Phase II:      Last vitals: Reviewed and per EMR flowsheets.        Anesthesia Post Evaluation    Patient location during evaluation: PACU  Patient participation: complete - patient participated  Level of consciousness: sleepy but conscious  Pain score: 0  Airway patency: patent  Nausea & Vomiting: no nausea and no vomiting  Complications: no  Cardiovascular status: blood pressure returned to baseline  Respiratory status: acceptable  Hydration status: euvolemic

## 2021-07-24 NOTE — PROGRESS NOTES
GENERAL SURGERY PROGRESS NOTE    Jose President is a 82 y.o. male POD 1 from common bile duct exploration with stone extraction and cholecystectomy. Subjective:  Doing ok this morning. Mild bump in LFTs as expected. Appropriate abdominal pain. Denies nausea or vomiting. Objective:    Vitals: VITALS:  BP (!) 140/74   Pulse 75   Temp 98.3 °F (36.8 °C) (Oral)   Resp 18   Ht 5' 8\" (1.727 m)   Wt 176 lb 3.2 oz (79.9 kg)   SpO2 92%   BMI 26.79 kg/m²     I/O: 07/23 0701 - 07/24 0700  In: 2268.3 [P.O.:120; I.V.:2098.3]  Out: 1165 [Urine:1000; Drains:65]    Labs/Imaging Results:   Lab Results   Component Value Date     07/24/2021    K 3.5 07/24/2021    CL 99 07/24/2021    CO2 26 07/24/2021    BUN 20 07/24/2021    CREATININE 1.3 07/24/2021    GLUCOSE 128 07/24/2021    CALCIUM 8.5 07/24/2021      Lab Results   Component Value Date    WBC 9.5 07/24/2021    HGB 14.4 07/24/2021    HCT 42.8 07/24/2021    MCV 79.6 07/24/2021     07/24/2021       IV Fluids: lactated ringers Last Rate: 100 mL/hr at 07/24/21 0211    sodium chloride    Scheduled Meds:   ampicillin-sulbactam, 1,500 mg, Intravenous, Q6H    indomethacin, 100 mg, Rectal, Once    pantoprazole, 40 mg, Intravenous, Daily    sodium chloride flush, 5-40 mL, Intravenous, 2 times per day    enoxaparin, 40 mg, Subcutaneous, Daily    metoprolol tartrate, 25 mg, Oral, BID    amLODIPine, 10 mg, Oral, Daily    tamsulosin, 0.4 mg, Oral, Daily    Physical Exam:  General: A&O x 3, no distress. HEENT: Anicteric sclerae, MMM. Extremities: No edema bilat LE. Abdomen: Soft, appropriately tender, dressing clean and dry     KAYCEE- 65cc serosanguinous output      Assessment and Plan:  82 y.o. male s/p cholecystectomy and common bile duct exploration for choledocholithiasis. Doing well post op.     Patient Active Problem List:     Choledocholithiasis      - regular diet  - trend labs  - prn pain control  - will continue to follow, anticipate discharge early this week    Dona Rosario MD

## 2021-07-24 NOTE — PROGRESS NOTES
251 07/24/2021       Lab Results   Component Value Date     07/24/2021    K 3.5 07/24/2021    CL 99 07/24/2021    CO2 26 07/24/2021    BUN 20 07/24/2021    CREATININE 1.3 07/24/2021    GLUCOSE 128 07/24/2021    CALCIUM 8.5 07/24/2021        Assessment and Plan: Active Hospital Problems    Diagnosis Date Noted    Choledocholithiasis [K80.50] 07/21/2021     Ashish Asencio is a 82 y.o.  male  who presents with abdominal pain     1) Acute Symptomatic Cholelithiasis and Choledocholithiasis   Status post ERCP and cholecystectomy  Pain control  Monitor labs.      2) Essential hypertension  -Uncontrolled  -Resume meds     Other chronic medical conditions, medication resumed unless contraindicated     BPH  GERD  Hyperlipidemia    Electronically signed by Merced Cross DO on 7/24/2021 at 3:28 PM

## 2021-07-24 NOTE — CONSULTS
Discussed consult with primary nurse. Consult canceled. PIV placed by floor nurse. Please consult IV/PICC team if patient's needs change.

## 2021-07-25 LAB
ALBUMIN SERPL-MCNC: 3.4 GM/DL (ref 3.4–5)
ALP BLD-CCNC: 159 IU/L (ref 40–128)
ALT SERPL-CCNC: 420 U/L (ref 10–40)
ANION GAP SERPL CALCULATED.3IONS-SCNC: 10 MMOL/L (ref 4–16)
AST SERPL-CCNC: 173 IU/L (ref 15–37)
BASOPHILS ABSOLUTE: 0 K/CU MM
BASOPHILS RELATIVE PERCENT: 0.4 % (ref 0–1)
BILIRUB SERPL-MCNC: 0.8 MG/DL (ref 0–1)
BUN BLDV-MCNC: 17 MG/DL (ref 6–23)
CALCIUM SERPL-MCNC: 8.5 MG/DL (ref 8.3–10.6)
CHLORIDE BLD-SCNC: 100 MMOL/L (ref 99–110)
CO2: 28 MMOL/L (ref 21–32)
CREAT SERPL-MCNC: 1.1 MG/DL (ref 0.9–1.3)
DIFFERENTIAL TYPE: ABNORMAL
EOSINOPHILS ABSOLUTE: 0 K/CU MM
EOSINOPHILS RELATIVE PERCENT: 0.2 % (ref 0–3)
GFR AFRICAN AMERICAN: >60 ML/MIN/1.73M2
GFR NON-AFRICAN AMERICAN: >60 ML/MIN/1.73M2
GLUCOSE BLD-MCNC: 115 MG/DL (ref 70–99)
HCT VFR BLD CALC: 42.3 % (ref 42–52)
HEMOGLOBIN: 14 GM/DL (ref 13.5–18)
IMMATURE NEUTROPHIL %: 2 % (ref 0–0.43)
LYMPHOCYTES ABSOLUTE: 0.7 K/CU MM
LYMPHOCYTES RELATIVE PERCENT: 6.4 % (ref 24–44)
MCH RBC QN AUTO: 26.9 PG (ref 27–31)
MCHC RBC AUTO-ENTMCNC: 33.1 % (ref 32–36)
MCV RBC AUTO: 81.2 FL (ref 78–100)
MONOCYTES ABSOLUTE: 1 K/CU MM
MONOCYTES RELATIVE PERCENT: 8.9 % (ref 0–4)
NUCLEATED RBC %: 0 %
PDW BLD-RTO: 16.6 % (ref 11.7–14.9)
PLATELET # BLD: 261 K/CU MM (ref 140–440)
PMV BLD AUTO: 10.9 FL (ref 7.5–11.1)
POTASSIUM SERPL-SCNC: 3.8 MMOL/L (ref 3.5–5.1)
RBC # BLD: 5.21 M/CU MM (ref 4.6–6.2)
SEGMENTED NEUTROPHILS ABSOLUTE COUNT: 9.2 K/CU MM
SEGMENTED NEUTROPHILS RELATIVE PERCENT: 82.1 % (ref 36–66)
SODIUM BLD-SCNC: 138 MMOL/L (ref 135–145)
TOTAL IMMATURE NEUTOROPHIL: 0.23 K/CU MM
TOTAL NUCLEATED RBC: 0 K/CU MM
TOTAL PROTEIN: 5.5 GM/DL (ref 6.4–8.2)
WBC # BLD: 11.3 K/CU MM (ref 4–10.5)

## 2021-07-25 PROCEDURE — 6360000002 HC RX W HCPCS: Performed by: SURGERY

## 2021-07-25 PROCEDURE — 6370000000 HC RX 637 (ALT 250 FOR IP): Performed by: SURGERY

## 2021-07-25 PROCEDURE — 94664 DEMO&/EVAL PT USE INHALER: CPT

## 2021-07-25 PROCEDURE — 94150 VITAL CAPACITY TEST: CPT

## 2021-07-25 PROCEDURE — 94761 N-INVAS EAR/PLS OXIMETRY MLT: CPT

## 2021-07-25 PROCEDURE — 99024 POSTOP FOLLOW-UP VISIT: CPT | Performed by: SURGERY

## 2021-07-25 PROCEDURE — 36415 COLL VENOUS BLD VENIPUNCTURE: CPT

## 2021-07-25 PROCEDURE — 80053 COMPREHEN METABOLIC PANEL: CPT

## 2021-07-25 PROCEDURE — 1200000000 HC SEMI PRIVATE

## 2021-07-25 PROCEDURE — C9113 INJ PANTOPRAZOLE SODIUM, VIA: HCPCS | Performed by: SURGERY

## 2021-07-25 PROCEDURE — 85025 COMPLETE CBC W/AUTO DIFF WBC: CPT

## 2021-07-25 RX ORDER — BUDESONIDE AND FORMOTEROL FUMARATE DIHYDRATE 80; 4.5 UG/1; UG/1
2 AEROSOL RESPIRATORY (INHALATION) DAILY
Status: DISCONTINUED | OUTPATIENT
Start: 2021-07-25 | End: 2021-07-26 | Stop reason: HOSPADM

## 2021-07-25 RX ADMIN — ONDANSETRON 4 MG: 2 INJECTION INTRAMUSCULAR; INTRAVENOUS at 07:35

## 2021-07-25 RX ADMIN — MORPHINE SULFATE 4 MG: 4 INJECTION, SOLUTION INTRAMUSCULAR; INTRAVENOUS at 01:17

## 2021-07-25 RX ADMIN — OXYCODONE HYDROCHLORIDE AND ACETAMINOPHEN 2 TABLET: 5; 325 TABLET ORAL at 21:47

## 2021-07-25 RX ADMIN — METOPROLOL TARTRATE 25 MG: 25 TABLET, FILM COATED ORAL at 21:47

## 2021-07-25 RX ADMIN — OXYCODONE HYDROCHLORIDE AND ACETAMINOPHEN 2 TABLET: 5; 325 TABLET ORAL at 07:35

## 2021-07-25 RX ADMIN — TAMSULOSIN HYDROCHLORIDE 0.4 MG: 0.4 CAPSULE ORAL at 09:21

## 2021-07-25 RX ADMIN — OXYCODONE HYDROCHLORIDE AND ACETAMINOPHEN 2 TABLET: 5; 325 TABLET ORAL at 14:54

## 2021-07-25 RX ADMIN — METOPROLOL TARTRATE 25 MG: 25 TABLET, FILM COATED ORAL at 09:21

## 2021-07-25 RX ADMIN — ONDANSETRON 4 MG: 2 INJECTION INTRAMUSCULAR; INTRAVENOUS at 15:18

## 2021-07-25 RX ADMIN — MORPHINE SULFATE 4 MG: 4 INJECTION, SOLUTION INTRAMUSCULAR; INTRAVENOUS at 05:57

## 2021-07-25 RX ADMIN — AMLODIPINE BESYLATE 10 MG: 10 TABLET ORAL at 09:21

## 2021-07-25 RX ADMIN — ENOXAPARIN SODIUM 40 MG: 30 INJECTION SUBCUTANEOUS at 09:21

## 2021-07-25 RX ADMIN — PANTOPRAZOLE SODIUM 40 MG: 40 INJECTION, POWDER, FOR SOLUTION INTRAVENOUS at 09:21

## 2021-07-25 ASSESSMENT — PAIN SCALES - GENERAL
PAINLEVEL_OUTOF10: 9
PAINLEVEL_OUTOF10: 6
PAINLEVEL_OUTOF10: 9
PAINLEVEL_OUTOF10: 7
PAINLEVEL_OUTOF10: 7
PAINLEVEL_OUTOF10: 4

## 2021-07-25 NOTE — PROGRESS NOTES
Hospitalist Progress Note      Name:  Yulissa Fuentes /Age/Sex: 1935  (82 y.o. male)   MRN & CSN:  [id number] & [id number] Admission Date/Time: 2021  5:22 PM   Location:  [facility]/0448-D PCP: Danna Barahona MD         Hospital Day: 5    Assessment and Plan:       1) Acute Symptomatic Cholelithiasis and Choledocholithiasis   Status post ERCP and cholecystectomy  Pain control  Monitor labs. , mild leukocytosis post operative  received iv AB   LFT still high  ,  and   Total bilirubin wnl   GI and DVT PPX     2) Essential hypertension    -on home meds     Other chronic medical conditions, medication resumed unless contraindicated     BPH  GERD  Hyperlipidemia    Diet ADULT DIET; Regular   DVT Prophylaxis [x] Lovenox, []  Heparin, [] SCDs, [] Ambulation   GI Prophylaxis [x] PPI,  [] H2 Blocker,  [] Carafate,  [] Diet/Tube Feeds   Code Status Full Code   Disposition Patient requires continued admission due to    MDM [] Low, [] Moderate,[]  High  Patient's risk as above due to      History of Present Illness: The patient was seen and examined at bedside. Denies abdominal pain, CP or Sob  No fever     Objective: Intake/Output Summary (Last 24 hours) at 2021 1026  Last data filed at 2021 0736  Gross per 24 hour   Intake 240 ml   Output 735 ml   Net -495 ml      Vitals:   Vitals:    21 0735   BP: (!) 150/96   Pulse: 87   Resp: 18   Temp: 97.7 °F (36.5 °C)   SpO2:      Physical Exam:   GEN Awake.  Alert , not in respiratory distress, not in pain  HEENT: PEERLA, , supple neck,   Chest: air entry equal bilaterally, no wheezing or crepitation  Heart: S1 and S2 heard, no murmur, no gallop or rub, regular rate  Abdomen: soft, ND , Nt, +BS  Extremities: no cyanosis, tenderness or erythema, peripheral pulses audible  Neurology: alert, oriented x3, able to move 4 limbs    Medications:   Medications:    budesonide-formoterol  2 puff Inhalation Daily    indomethacin  100 mg Rectal Once    pantoprazole  40 mg Intravenous Daily    sodium chloride flush  5-40 mL Intravenous 2 times per day    enoxaparin  40 mg Subcutaneous Daily    metoprolol tartrate  25 mg Oral BID    amLODIPine  10 mg Oral Daily    tamsulosin  0.4 mg Oral Daily      Infusions:    sodium chloride       PRN Meds: morphine, 2 mg, Q4H PRN  morphine, 4 mg, Q4H PRN  oxyCODONE-acetaminophen, 1 tablet, Q4H PRN  oxyCODONE-acetaminophen, 2 tablet, Q4H PRN  sodium chloride flush, 5-40 mL, PRN  sodium chloride, 25 mL, PRN  ondansetron, 4 mg, Q8H PRN   Or  ondansetron, 4 mg, Q6H PRN  polyethylene glycol, 17 g, Daily PRN  acetaminophen, 650 mg, Q6H PRN   Or  acetaminophen, 650 mg, Q6H PRN  hydrALAZINE (APRESOLINE) ivpb, 10 mg, Q6H PRN          Electronically signed by Bill Herrera MD on 7/25/2021 at 10:26 AM

## 2021-07-25 NOTE — PROGRESS NOTES
GENERAL SURGERY PROGRESS NOTE    Kin Moroe is a 82 y.o. male POD 2 from common bile duct exploration with stone extraction and cholecystectomy. Subjective:  Doing well this morning. Pain controlled. Tolerating PO. Objective:    Vitals: VITALS:  BP (!) 150/96   Pulse 87   Temp 97.7 °F (36.5 °C) (Oral)   Resp 18   Ht 5' 8\" (1.727 m)   Wt 180 lb 12.8 oz (82 kg)   SpO2 91%   BMI 27.49 kg/m²     I/O: 07/24 0701 - 07/25 0700  In: 600 [P.O.:600]  Out: 735 [Urine:650; Drains:85]    Labs/Imaging Results:   Lab Results   Component Value Date     07/25/2021    K 3.8 07/25/2021     07/25/2021    CO2 28 07/25/2021    BUN 17 07/25/2021    CREATININE 1.1 07/25/2021    GLUCOSE 115 07/25/2021    CALCIUM 8.5 07/25/2021      Lab Results   Component Value Date    WBC 11.3 (H) 07/25/2021    HGB 14.0 07/25/2021    HCT 42.3 07/25/2021    MCV 81.2 07/25/2021     07/25/2021       IV Fluids: sodium chloride    Scheduled Meds:   budesonide-formoterol, 2 puff, Inhalation, Daily    indomethacin, 100 mg, Rectal, Once    pantoprazole, 40 mg, Intravenous, Daily    sodium chloride flush, 5-40 mL, Intravenous, 2 times per day    enoxaparin, 40 mg, Subcutaneous, Daily    metoprolol tartrate, 25 mg, Oral, BID    amLODIPine, 10 mg, Oral, Daily    tamsulosin, 0.4 mg, Oral, Daily    Physical Exam:  General: A&O x 3, no distress. HEENT: Anicteric sclerae, MMM. Extremities: No edema bilat LE. Abdomen: Soft, appropriately tender, dressing clean and dry     KAYCEE- 85cc serosanguinous output      Assessment and Plan:  82 y.o. male s/p cholecystectomy and common bile duct exploration for choledocholithiasis. Doing well post op. LFTs improved.     Patient Active Problem List:     Choledocholithiasis      - regular diet  - prn pain control  - anticipate home tomorrow if continues to do well    Zabrina Diana MD

## 2021-07-26 VITALS
DIASTOLIC BLOOD PRESSURE: 72 MMHG | WEIGHT: 180.8 LBS | TEMPERATURE: 97.9 F | BODY MASS INDEX: 27.4 KG/M2 | SYSTOLIC BLOOD PRESSURE: 117 MMHG | RESPIRATION RATE: 20 BRPM | OXYGEN SATURATION: 93 % | HEIGHT: 68 IN | HEART RATE: 69 BPM

## 2021-07-26 PROCEDURE — 94150 VITAL CAPACITY TEST: CPT

## 2021-07-26 PROCEDURE — 94761 N-INVAS EAR/PLS OXIMETRY MLT: CPT

## 2021-07-26 PROCEDURE — 94640 AIRWAY INHALATION TREATMENT: CPT

## 2021-07-26 PROCEDURE — 6360000002 HC RX W HCPCS: Performed by: SURGERY

## 2021-07-26 PROCEDURE — 6370000000 HC RX 637 (ALT 250 FOR IP): Performed by: SURGERY

## 2021-07-26 PROCEDURE — C9113 INJ PANTOPRAZOLE SODIUM, VIA: HCPCS | Performed by: SURGERY

## 2021-07-26 PROCEDURE — 99024 POSTOP FOLLOW-UP VISIT: CPT | Performed by: SURGERY

## 2021-07-26 PROCEDURE — 2580000003 HC RX 258: Performed by: SURGERY

## 2021-07-26 RX ORDER — OXYCODONE HYDROCHLORIDE AND ACETAMINOPHEN 5; 325 MG/1; MG/1
1 TABLET ORAL EVERY 6 HOURS PRN
Qty: 25 TABLET | Refills: 0 | Status: SHIPPED | OUTPATIENT
Start: 2021-07-26 | End: 2021-07-31

## 2021-07-26 RX ORDER — POLYETHYLENE GLYCOL 3350 17 G/17G
17 POWDER, FOR SOLUTION ORAL DAILY PRN
Qty: 10 EACH | Refills: 0 | Status: SHIPPED | OUTPATIENT
Start: 2021-07-26

## 2021-07-26 RX ORDER — AMLODIPINE BESYLATE 10 MG/1
10 TABLET ORAL DAILY
Qty: 30 TABLET | Refills: 0 | Status: SHIPPED | OUTPATIENT
Start: 2021-07-27

## 2021-07-26 RX ADMIN — AMLODIPINE BESYLATE 10 MG: 10 TABLET ORAL at 07:58

## 2021-07-26 RX ADMIN — SODIUM CHLORIDE, PRESERVATIVE FREE 10 ML: 5 INJECTION INTRAVENOUS at 07:58

## 2021-07-26 RX ADMIN — OXYCODONE HYDROCHLORIDE AND ACETAMINOPHEN 1 TABLET: 5; 325 TABLET ORAL at 14:27

## 2021-07-26 RX ADMIN — MORPHINE SULFATE 4 MG: 4 INJECTION, SOLUTION INTRAMUSCULAR; INTRAVENOUS at 00:49

## 2021-07-26 RX ADMIN — BUDESONIDE AND FORMOTEROL FUMARATE DIHYDRATE 2 PUFF: 80; 4.5 AEROSOL RESPIRATORY (INHALATION) at 08:15

## 2021-07-26 RX ADMIN — METOPROLOL TARTRATE 25 MG: 25 TABLET, FILM COATED ORAL at 07:58

## 2021-07-26 RX ADMIN — ONDANSETRON 4 MG: 2 INJECTION INTRAMUSCULAR; INTRAVENOUS at 02:37

## 2021-07-26 RX ADMIN — ENOXAPARIN SODIUM 40 MG: 30 INJECTION SUBCUTANEOUS at 07:59

## 2021-07-26 RX ADMIN — TAMSULOSIN HYDROCHLORIDE 0.4 MG: 0.4 CAPSULE ORAL at 07:58

## 2021-07-26 RX ADMIN — PANTOPRAZOLE SODIUM 40 MG: 40 INJECTION, POWDER, FOR SOLUTION INTRAVENOUS at 07:58

## 2021-07-26 ASSESSMENT — PAIN DESCRIPTION - ORIENTATION: ORIENTATION: RIGHT;MID

## 2021-07-26 ASSESSMENT — PAIN DESCRIPTION - PAIN TYPE: TYPE: ACUTE PAIN

## 2021-07-26 ASSESSMENT — PAIN DESCRIPTION - LOCATION: LOCATION: ABDOMEN

## 2021-07-26 ASSESSMENT — PAIN - FUNCTIONAL ASSESSMENT: PAIN_FUNCTIONAL_ASSESSMENT: ACTIVITIES ARE NOT PREVENTED

## 2021-07-26 ASSESSMENT — PAIN DESCRIPTION - FREQUENCY: FREQUENCY: INTERMITTENT

## 2021-07-26 ASSESSMENT — PAIN SCALES - GENERAL
PAINLEVEL_OUTOF10: 9
PAINLEVEL_OUTOF10: 7
PAINLEVEL_OUTOF10: 0

## 2021-07-26 ASSESSMENT — PAIN DESCRIPTION - DESCRIPTORS: DESCRIPTORS: ACHING;SHARP

## 2021-07-26 ASSESSMENT — PAIN DESCRIPTION - PROGRESSION: CLINICAL_PROGRESSION: NOT CHANGED

## 2021-07-26 ASSESSMENT — PAIN DESCRIPTION - ONSET: ONSET: ON-GOING

## 2021-07-26 NOTE — PROGRESS NOTES
GENERAL SURGERY PROGRESS NOTE    Sarai Valladares is a 82 y.o. male POD 3 from common bile duct exploration with stone extraction and cholecystectomy. Subjective:  Doing well this morning. Pain controlled. Tolerating PO. Objective:    Vitals: VITALS:  /78   Pulse 63   Temp 98.2 °F (36.8 °C) (Oral)   Resp 18   Ht 5' 8\" (1.727 m)   Wt 180 lb 12.8 oz (82 kg)   SpO2 96%   BMI 27.49 kg/m²     I/O: 07/25 0701 - 07/26 0700  In: -   Out: 100 [Drains:100]    Labs/Imaging Results:   Lab Results   Component Value Date     07/25/2021    K 3.8 07/25/2021     07/25/2021    CO2 28 07/25/2021    BUN 17 07/25/2021    CREATININE 1.1 07/25/2021    GLUCOSE 115 07/25/2021    CALCIUM 8.5 07/25/2021      Lab Results   Component Value Date    WBC 11.3 (H) 07/25/2021    HGB 14.0 07/25/2021    HCT 42.3 07/25/2021    MCV 81.2 07/25/2021     07/25/2021       IV Fluids: sodium chloride    Scheduled Meds:   budesonide-formoterol, 2 puff, Inhalation, Daily    indomethacin, 100 mg, Rectal, Once    pantoprazole, 40 mg, Intravenous, Daily    sodium chloride flush, 5-40 mL, Intravenous, 2 times per day    enoxaparin, 40 mg, Subcutaneous, Daily    metoprolol tartrate, 25 mg, Oral, BID    amLODIPine, 10 mg, Oral, Daily    tamsulosin, 0.4 mg, Oral, Daily    Physical Exam:  General: A&O x 3, no distress. HEENT: Anicteric sclerae, MMM. Extremities: No edema bilat LE. Abdomen: Soft, appropriately tender, dressing clean and dry     KAYCEE- 100cc serous output      Assessment and Plan:  82 y.o. male s/p cholecystectomy and common bile duct exploration for choledocholithiasis. Doing well post op. LFTs improved.     Patient Active Problem List:     Choledocholithiasis      - regular diet  - drain removed  - ok for home today  - follow up with me in [place] in 2 weeks    Ligia Manrique MD

## 2021-07-26 NOTE — DISCHARGE SUMMARY
Discharge Summary  Emigdio Handy MD, MD     Name:  Noemi Edmondson /Age/Sex: 1935  (82 y.o. male)   MRN & CSN:  [id number] & [id number] Admission Date/Time: 2021  5:22 PM   Attending:  Emigdio Handy MD Discharging Physician: Emigdio Handy MD, MD     Hospital Course:   Noemi Edmondson is a 82 y.o.  male  who presents with cholelithiasis with choledocholithiasis    Acute Symptomatic Cholelithiasis and Choledocholithiasis. Status post ERCP and cholecystectomy. Pain well controlled. The patient was tolerating diet without any difficulty on the day of the discharge. Once cleared by GI and general surgery the patient was discharged home     Essential hypertension. Resume home medications     BPH  GERD  Hyperlipidemia     The patient expressed appropriate understanding of and agreement with the discharge recommendations, medications, and plan.      Consults this admission:  IP CONSULT TO GI  IP CONSULT TO [address] TO IV TEAM    Discharge Instruction:     Follow up appointments:     Fanta Mckinney MD  [address] [place] [address] [id number]    In 1 week        Primary care physician:  within 2 weeks    Diet:  low fat, low cholesterol diet     Activity: activity as tolerated    Disposition: Discharged to:   [x]Home, []HHC, []SNF, []Acute Rehab, []Hospice     Condition on discharge: Stable    Discharge Medications:      Baylee Pabon   Home Medication Instructions [id number]    Printed on:21 1200   Medication Information                      amLODIPine (NORVASC) 10 MG tablet  Take 1 tablet by mouth daily             metoprolol tartrate (LOPRESSOR) 25 MG tablet  Take 1 tablet by mouth 2 times daily             polyethylene glycol (GLYCOLAX) 17 g packet  Take 17 g by mouth daily as needed for Constipation                 Objective Findings at Discharge:     /78   Pulse 63   Temp 98.2 °F (36.8 °C) (Oral)   Resp 18   Ht 5' 8\" (1.727 m)   Wt 180 lb

## 2021-07-26 NOTE — FLOWSHEET NOTE
07/26/21 1135   Encounter Summary   Services provided to: Patient not available   Referral/Consult From: Rounding   Continue Visiting Yes   Routine   Type Initial   Assessment Unable to respond;Sleeping

## 2021-07-26 NOTE — PROGRESS NOTES
Discharge instructions reviewed with patient, all questions answered. IV removed. Will call convenient transportation when patient is ready and dressed.

## 2021-08-02 NOTE — ANESTHESIA POSTPROCEDURE EVALUATION
Department of Anesthesiology  Postprocedure Note    Patient: Margo Sims  MRN: [id number]  YOB: 1935  Date of evaluation: 8/2/2021  Time:  1:20 PM     Procedure Summary     Date: 07/22/21 Room / Location: [address]    Anesthesia Start: 1465 Anesthesia Stop: 8466    Procedure: EGD DILATION BALLOON (N/A ) Diagnosis: (----)    Surgeons: Emile Dean MD Responsible Provider: Zuleyma Sanchez MD    Anesthesia Type: general ASA Status: 2          Anesthesia Type: general    Clair Phase I: Clair Score: 9    Clair Phase II:      Last vitals: Reviewed and per EMR flowsheets.        Anesthesia Post Evaluation    Patient location during evaluation: PACU  Patient participation: complete - patient participated  Level of consciousness: awake  Airway patency: patent  Nausea & Vomiting: no nausea and no vomiting  Complications: no  Cardiovascular status: blood pressure returned to baseline  Respiratory status: acceptable  Hydration status: euvolemic

## 2021-08-03 NOTE — PROGRESS NOTES
Physician Progress Note      PATIENT:               Lalla Square  CSN #:                  [id number]  :                       1935  ADMIT DATE:       2021 5:22 PM  [address] DATE:        2021 7:50 PM  RESPONDING  PROVIDER #:        Lorelei SHIPMAN          QUERY TEXT:    Hospitalists,    Pt s/p cholecystectomy and noted to have rise in creatinine. If possible,   please document in the progress notes and discharge summary if you are   evaluating and/or treating any of the following: The medical record reflects the following:  Risk Factors: s/p cholecystectomy  Clinical Indicators: creat 0.7-1.3 with rise from 0.7-1.3 in less than 24hr  Treatment: labs, IVF    Thank you,  Yao Galan RN CDS    Defined by Kidney Disease Improving Global Outcomes (KDIGO) clinical practice   guideline for acute kidney injury:  -Increase in SCr by greater than or equal to 0.3 mg/dl within 48 hours; or  -Increase in SCr to greater than or equal to 1.5 times baseline, which is   known or presumed to have occurred within the prior 7 days; or  -Urine volume < 0.5ml/kg/h for 6 hours  Options provided:  -- Acute kidney failure  -- Acute kidney injury  -- Other - I will add my own diagnosis  -- Disagree - Not applicable / Not valid  -- Disagree - Clinically unable to determine / Unknown  -- Refer to Clinical Documentation Reviewer    PROVIDER RESPONSE TEXT:    Provider disagreed with this query. Baseline Cr is 1.2.  Looks like 0.7 is an outlier    Query created by: Vijaya Westbrook on 2021 11:39 AM      Electronically signed by:  Sukumar Jane 8/3/2021 3:34 PM

## 2021-08-09 ENCOUNTER — OFFICE VISIT (OUTPATIENT)
Dept: SURGERY | Age: 86
End: 2021-08-09

## 2021-08-09 VITALS
BODY MASS INDEX: 25.76 KG/M2 | HEART RATE: 85 BPM | DIASTOLIC BLOOD PRESSURE: 78 MMHG | SYSTOLIC BLOOD PRESSURE: 102 MMHG | HEIGHT: 68 IN | WEIGHT: 170 LBS

## 2021-08-09 DIAGNOSIS — Z09 POSTOPERATIVE EXAMINATION: Primary | ICD-10-CM

## 2021-08-09 PROCEDURE — 99024 POSTOP FOLLOW-UP VISIT: CPT | Performed by: SURGERY

## 2021-08-09 RX ORDER — ASPIRIN 325 MG
650 TABLET ORAL DAILY
COMMUNITY

## 2021-08-09 RX ORDER — HYDROCODONE BITARTRATE AND ACETAMINOPHEN 5; 325 MG/1; MG/1
1 TABLET ORAL EVERY 4 HOURS PRN
COMMUNITY
Start: 2021-07-20

## 2021-08-09 NOTE — PROGRESS NOTES
Transportation (Non-Medical):    Physical Activity:     Days of Exercise per Week:     Minutes of Exercise per Session:    Stress:     Feeling of Stress :    Social Connections:     Frequency of Communication with Friends and Family:     Frequency of Social Gatherings with Friends and Family:     Attends Taoism Services:     Active Member of Clubs or Organizations:     Attends Club or Organization Meetings:     Marital Status:    Intimate Partner Violence:     Fear of Current or Ex-Partner:     Emotionally Abused:     Physically Abused:     Sexually Abused:        OBJECTIVE:  Physical Exam  General: awake, alert, in no acute distress  HEENT: mucous membranes moist  Respiratory: normal effort, no wheezes appreciated  CV: appears well perfused  Abdomen: Soft, appropriately tender, non-distended. RUQ incision clean and dry with staples. Skin: warm and dry  Extremities: atraumatic  Neuro: no focal deficits noted  Psych: mood normal        Pathology report reveals:   Final Pathologic Diagnosis:   A. Gallbladder, cholecystectomy:   -     Chronic cholecystitis with cholelithiasis. B. Cystic duct and biliary stones: -     Biliary duct with chronic inflammation. -     Stones. ASSESSMENT:  82 y.o. male s/p common bile duct exploration. Doing well.     1. Postoperative examination        PLAN:  - staples removed  - continue lifting restrictions for 4 more weeks  - call if problems arise      Noemí Townsend MD  8/9/2021  3:27 PM

## (undated) DEVICE — SUTURE VCRL SZ 0 L27IN ABSRB UD L36MM CT-1 1/2 CIR J260H

## (undated) DEVICE — Z DISCONTINUED (USE MFG CAT MVABO)  TUBING GAS SAMPLING STD 6.5 FT FEMALE CONN SMRT CAPNOLINE

## (undated) DEVICE — BAG DRNGE 9OZ L9.5IN BILE W/ ADPT TWO ADJ RUB BELT DISP FOR

## (undated) DEVICE — CLIP INT L ORNG TI TRNSVRS GRV CHEVRON SHP W/ PRECIS TIP TO

## (undated) DEVICE — CIRCUIT BREATHING SINGLE LIMB AD 72 IN 3 LT 2 FILTER LIMBO

## (undated) DEVICE — SUTURE PERMAHAND SZ 2-0 L30IN NONABSORBABLE BLK SH L26MM C016D

## (undated) DEVICE — SPONGE LAP W18XL18IN WHT COT 4 PLY FLD STRUNG RADPQ DISP ST

## (undated) DEVICE — SOLUTION IV STRL H2O 1000 ML INJ USP EXCEL CONTAINER

## (undated) DEVICE — APPLIER CLP M L L11.4IN DIA10MM ENDOSCP ROT MULT FOR LIG

## (undated) DEVICE — CATHETER DIL 6FR L180CM BLLN INFLATED 36-40.5-45FR L8CM ES

## (undated) DEVICE — SUTURE PDS II SZ 1 L96IN ABSRB VLT TP-1 L65MM 1/2 CIR Z880G

## (undated) DEVICE — PENCIL ES CRD L10FT HND SWCHING ROCK SWCH W/ EDGE COAT BLDE

## (undated) DEVICE — SYRINGE INFL 60ML DISP ALLIANCE II

## (undated) DEVICE — AGENT HEMSTAT W2XL3IN OXIDIZED REGENERATED CELOS ABSRB

## (undated) DEVICE — Device

## (undated) DEVICE — GLOVE SURG SZ 7 CRM LTX FREE POLYISOPRENE POLYMER BEAD ANTI

## (undated) DEVICE — FOGARTY BILIARY BALLOON PROBE 6F 23CM: Brand: FOGARTY

## (undated) DEVICE — PAD GZ BORD ST 4INX10IN 2X8IN

## (undated) DEVICE — TOTAL TRAY, DB, 100% SILI FOLEY, 16FR 10: Brand: MEDLINE

## (undated) DEVICE — ELECTRODE ES L2.75IN S STL INSUL BLDE W/ SL EDGE

## (undated) DEVICE — INTENDED FOR TISSUE SEPARATION, AND OTHER PROCEDURES THAT REQUIRE A SHARP SURGICAL BLADE TO PUNCTURE OR CUT.: Brand: BARD-PARKER ® STAINLESS STEEL BLADES

## (undated) DEVICE — Z DISCONTINUED NO SUB IDED DRAIN SURG 14FR XBAR L12IN STEM L11IN GRAV W/ CATTELL T TB

## (undated) DEVICE — RESERVOIR,SUCTION,100CC,SILICONE: Brand: MEDLINE

## (undated) DEVICE — CATHETER CHOLGM 4.5FR L18IN W/ MTL SUPP TB

## (undated) DEVICE — APPLICATOR MEDICATED 26 CC SOLUTION HI LT ORNG CHLORAPREP

## (undated) DEVICE — SET IRRIG L94IN ID0.281IN W/ 4.5IN DST FLX CONN 2 LD ON OFF

## (undated) DEVICE — MARKER SURG SKIN UTIL REGULAR/FINE 2 TIP W/ RUL AND 9 LBL

## (undated) DEVICE — ESOPHAGEAL BALLOON DILATATION CATHETER: Brand: CRE FIXED WIRE

## (undated) DEVICE — SURGICAL PROCEDURE PACK BASIC

## (undated) DEVICE — DECANTER BAG 9": Brand: MEDLINE INDUSTRIES, INC.

## (undated) DEVICE — GAUZE,SPONGE,4"X4",16PLY,XRAY,STRL,LF: Brand: MEDLINE

## (undated) DEVICE — DISSECTOR LAP DIA5MM BLNT TIP ENDOPATH

## (undated) DEVICE — DRAPE SHEET ULTRAGARD: Brand: MEDLINE

## (undated) DEVICE — SPONGE GZ W4XL8IN COT WVN 12 PLY

## (undated) DEVICE — SPHINCTEROTOME: Brand: HYDRATOME RX 44

## (undated) DEVICE — LOOP,VESSEL,MINI,BLUE,2/PK,STERILE: Brand: MEDLINE

## (undated) DEVICE — DRAIN SURG 15FR SIL RND CHN W/ TRCR FULL FLUT DBL WRP TRAD